# Patient Record
Sex: MALE | Race: BLACK OR AFRICAN AMERICAN | NOT HISPANIC OR LATINO | URBAN - METROPOLITAN AREA
[De-identification: names, ages, dates, MRNs, and addresses within clinical notes are randomized per-mention and may not be internally consistent; named-entity substitution may affect disease eponyms.]

---

## 2017-05-23 ENCOUNTER — EMERGENCY (EMERGENCY)
Facility: HOSPITAL | Age: 47
LOS: 1 days | End: 2017-05-23
Payer: OTHER GOVERNMENT

## 2017-05-23 PROBLEM — Z00.00 ENCOUNTER FOR PREVENTIVE HEALTH EXAMINATION: Status: ACTIVE | Noted: 2017-05-23

## 2017-05-23 PROCEDURE — 99285 EMERGENCY DEPT VISIT HI MDM: CPT

## 2017-05-24 PROCEDURE — 74177 CT ABD & PELVIS W/CONTRAST: CPT | Mod: 26

## 2020-06-19 ENCOUNTER — RESULT REVIEW (OUTPATIENT)
Age: 50
End: 2020-06-19

## 2020-06-20 ENCOUNTER — RESULT REVIEW (OUTPATIENT)
Age: 50
End: 2020-06-20

## 2020-07-08 ENCOUNTER — TRANSCRIPTION ENCOUNTER (OUTPATIENT)
Age: 50
End: 2020-07-08

## 2021-11-18 ENCOUNTER — EMERGENCY (EMERGENCY)
Facility: HOSPITAL | Age: 51
LOS: 1 days | Discharge: HOME | End: 2021-11-18
Payer: COMMERCIAL

## 2021-11-18 VITALS
SYSTOLIC BLOOD PRESSURE: 124 MMHG | OXYGEN SATURATION: 97 % | HEART RATE: 90 BPM | TEMPERATURE: 98 F | RESPIRATION RATE: 18 BRPM | DIASTOLIC BLOOD PRESSURE: 77 MMHG

## 2021-11-18 LAB — SARS-COV-2 RNA SPEC QL NAA+PROBE: SIGNIFICANT CHANGE UP

## 2021-11-18 PROCEDURE — 99283 EMERGENCY DEPT VISIT LOW MDM: CPT

## 2021-11-18 PROCEDURE — U0003: CPT

## 2021-11-18 PROCEDURE — U0005: CPT

## 2021-11-18 PROCEDURE — 99284 EMERGENCY DEPT VISIT MOD MDM: CPT

## 2021-11-18 NOTE — ED PROVIDER NOTE - OBJECTIVE STATEMENT
51 .y/o M presents to ED for COVID testing. Pt currently presents with nausea and bodyaches. No chest pain, sob, cough, abd pain, v/d, headache, dizziness. - sick contacts. No recent travel. Pt well appearing. NAD.

## 2021-11-18 NOTE — ED PROVIDER NOTE - CLINICAL SUMMARY MEDICAL DECISION MAKING FREE TEXT BOX
Pt nontoxic appearing, stable vitals, ambulatory with stable saturation without supplemental oxygen. PT does not meet criteria listed in most updated guidelines as per [facility] protocol/algorithm for admission at this time. pt advised about self-quarantine instructions until negative test results and/or symptom resolution. pt advised on hand hygiene, monitoring of symptoms, antipyretic use as well as and fu with primary care provider. Instructions given in pre-printed copy.

## 2021-11-18 NOTE — ED PROVIDER NOTE - PATIENT PORTAL LINK FT
You can access the FollowMyHealth Patient Portal offered by [facility] by registering at the following website: [email or web address]. By joining [employer]’s FollowMyHealth portal, you will also be able to view your health information using other applications (apps) compatible with our system.

## 2021-11-20 DIAGNOSIS — M79.10 MYALGIA, UNSPECIFIED SITE: ICD-10-CM

## 2021-11-20 DIAGNOSIS — R11.0 NAUSEA: ICD-10-CM

## 2021-11-20 DIAGNOSIS — Z20.822 CONTACT WITH AND (SUSPECTED) EXPOSURE TO COVID-19: ICD-10-CM

## 2022-01-03 ENCOUNTER — EMERGENCY (EMERGENCY)
Facility: HOSPITAL | Age: 52
LOS: 1 days | Discharge: HOME | End: 2022-01-03
Attending: EMERGENCY MEDICINE
Payer: COMMERCIAL

## 2022-01-03 VITALS
DIASTOLIC BLOOD PRESSURE: 73 MMHG | TEMPERATURE: 98 F | RESPIRATION RATE: 18 BRPM | SYSTOLIC BLOOD PRESSURE: 119 MMHG | HEART RATE: 71 BPM | OXYGEN SATURATION: 99 % | WEIGHT: 190.04 LBS

## 2022-01-03 PROCEDURE — 99284 EMERGENCY DEPT VISIT MOD MDM: CPT | Mod: 25

## 2022-01-03 PROCEDURE — 99284 EMERGENCY DEPT VISIT MOD MDM: CPT

## 2022-01-03 PROCEDURE — 0225U NFCT DS DNA&RNA 21 SARSCOV2: CPT

## 2022-01-03 RX ORDER — LIDOCAINE 4 G/100G
1 CREAM TOPICAL
Qty: 4 | Refills: 0
Start: 2022-01-03 | End: 2022-01-06

## 2022-01-03 RX ORDER — IBUPROFEN 200 MG
1 TABLET ORAL
Qty: 20 | Refills: 0
Start: 2022-01-03 | End: 2022-01-07

## 2022-01-03 RX ORDER — IBUPROFEN 200 MG
1 TABLET ORAL
Qty: 20 | Refills: 0
Start: 2022-01-03 | End: 2022-08-27

## 2022-01-03 RX ORDER — KETOROLAC TROMETHAMINE 30 MG/ML
30 SYRINGE (ML) INJECTION ONCE
Refills: 0 | Status: DISCONTINUED | OUTPATIENT
Start: 2022-01-03 | End: 2022-01-03

## 2022-01-03 RX ORDER — ACETAMINOPHEN 500 MG
650 TABLET ORAL ONCE
Refills: 0 | Status: COMPLETED | OUTPATIENT
Start: 2022-01-03 | End: 2022-01-03

## 2022-01-03 RX ORDER — DIAZEPAM 5 MG
5 TABLET ORAL ONCE
Refills: 0 | Status: DISCONTINUED | OUTPATIENT
Start: 2022-01-03 | End: 2022-01-03

## 2022-01-03 RX ORDER — IBUPROFEN 200 MG
600 TABLET ORAL ONCE
Refills: 0 | Status: COMPLETED | OUTPATIENT
Start: 2022-01-03 | End: 2022-01-03

## 2022-01-03 RX ORDER — METHOCARBAMOL 500 MG/1
1500 TABLET, FILM COATED ORAL ONCE
Refills: 0 | Status: COMPLETED | OUTPATIENT
Start: 2022-01-03 | End: 2022-01-03

## 2022-01-03 RX ORDER — METHOCARBAMOL 500 MG/1
1 TABLET, FILM COATED ORAL
Qty: 16 | Refills: 0
Start: 2022-01-03 | End: 2022-01-06

## 2022-01-03 RX ORDER — LIDOCAINE 4 G/100G
1 CREAM TOPICAL
Qty: 4 | Refills: 0
Start: 2022-01-03 | End: 2022-08-26

## 2022-01-03 RX ORDER — LIDOCAINE 4 G/100G
1 CREAM TOPICAL ONCE
Refills: 0 | Status: COMPLETED | OUTPATIENT
Start: 2022-01-03 | End: 2022-01-03

## 2022-01-03 RX ADMIN — METHOCARBAMOL 1500 MILLIGRAM(S): 500 TABLET, FILM COATED ORAL at 22:16

## 2022-01-03 RX ADMIN — Medication 600 MILLIGRAM(S): at 22:16

## 2022-01-03 RX ADMIN — Medication 650 MILLIGRAM(S): at 22:16

## 2022-01-03 RX ADMIN — LIDOCAINE 1 PATCH: 4 CREAM TOPICAL at 22:16

## 2022-01-03 NOTE — ED PROVIDER NOTE - NSTIMEPROVIDERCAREINITIATE_GEN_ER
03-Jan-2022 21:22
Enrrique Maza (DO), Medicine  Dept Director  [address] [address]  Phone: [phone]  Fax: [phone]

## 2022-01-03 NOTE — ED PROVIDER NOTE - PATIENT PORTAL LINK FT
You can access the FollowMyHealth Patient Portal offered by [facility] by registering at the following website: [email or web address]. By joining [employer]’s FollowMyHealth portal, you will also be able to view your health information using other applications (apps) compatible with our system.

## 2022-01-03 NOTE — ED PROVIDER NOTE - CLINICAL SUMMARY MEDICAL DECISION MAKING FREE TEXT BOX
PT with stable VS, no acute distress, non toxic appearing, tolerating PO in the ED, Pt with no fever, able to move neck with encouragement, Pt with no photophobia, no HA, no dizziness, Pt neuro intact, Pt to be tx with trial of symptomatic medications, follow up to PCP, return for change in symptoms, educated about when to return to the ED if needed. PT verbalizes that he understands all instructions and results. Pt informed that ED is open and available 24/7 365 days a yr, encouraged to return to the ED if they have any change in condition, or feel the need for revaluation.

## 2022-01-03 NOTE — ED PROVIDER NOTE - ADDITIONAL NOTES AND INSTRUCTIONS:
PT was evaluated At [facility] ED and was found to have a condition that warranted time of to rest and heal from WORK/SCHOOL.   Rd Feliciano PA-C

## 2022-01-03 NOTE — ED PROVIDER NOTE - ATTENDING CONTRIBUTION TO CARE
I personally saw the patient with the PA, and completed the key components of the history and physical exam. I then discussed the management plan with the PA.   gen in nad resp clear cardiac no murmur abfd soft neuro intact

## 2022-01-03 NOTE — ED PROVIDER NOTE - NS CPE EDP MUSC CERVICAL LOC
BL paraspinal ttp, spasm of muscles. no midline ttp, MARGAUX, strenght intact, no palpible katie abnormality.

## 2022-01-03 NOTE — ED PROVIDER NOTE - NS ED ROS FT
ROS: CONTUSIONAL: Denies fever, chills, fatigue, wt loss. HEAD: Denies trauma, HA, Dizziness. EYE: Denies Acute visual changes, diplopia. ENMT:  nasal congewtion Denies change in hearing, tinnitus, epistaxis, difficulty swallowing, sore throat. CARDIO: Denies CP, palpitations, edema. RESP: Denies Cough, SOB , Diff breathing, hemoptysis. GI: Denies N/V, ABD pain, change in bowel movement. URINARY: Denies difficulty urinating, pelvic pain. MS:  Denies joint pain, back pain, weakness, decreased ROM, swelling. NEURO: Denies change in gait, seizures, loss of sensation, dizziness, confusion LOC.  PSY: NO SI/HI. SKIN: Denies Rash, bruising.

## 2022-01-03 NOTE — ED PROVIDER NOTE - OBJECTIVE STATEMENT
PT with no SPMHx presents to the ED with complaint of neck pain x 2 days. Pt states that he has had general viral illness over the last wk with nasal congestion, body aches, chills, then yesterday started to have sever neck pain. Pt states that he now has sever, constat, sharp neck pain that radiates down his back made worse with activity improved by nothing. PT denies fever, chills, numbness tingling, loss of sensation saddle anesthesia, weakness, HE, loss of control of urine, or bowels IVDA.

## 2022-01-04 PROBLEM — Z78.9 OTHER SPECIFIED HEALTH STATUS: Chronic | Status: ACTIVE | Noted: 2021-11-18

## 2022-01-04 LAB
RAPID RVP RESULT: SIGNIFICANT CHANGE UP
SARS-COV-2 RNA SPEC QL NAA+PROBE: SIGNIFICANT CHANGE UP

## 2022-01-27 ENCOUNTER — EMERGENCY (EMERGENCY)
Facility: HOSPITAL | Age: 52
LOS: 1 days | Discharge: HOME | End: 2022-01-27
Attending: EMERGENCY MEDICINE
Payer: COMMERCIAL

## 2022-01-27 VITALS
SYSTOLIC BLOOD PRESSURE: 119 MMHG | TEMPERATURE: 98 F | WEIGHT: 160.06 LBS | RESPIRATION RATE: 20 BRPM | DIASTOLIC BLOOD PRESSURE: 74 MMHG | HEART RATE: 80 BPM | OXYGEN SATURATION: 99 %

## 2022-01-27 LAB
ALBUMIN SERPL ELPH-MCNC: 3.8 G/DL — SIGNIFICANT CHANGE UP (ref 3.3–5.2)
ALP SERPL-CCNC: 63 U/L — SIGNIFICANT CHANGE UP (ref 40–120)
ALT FLD-CCNC: 14 U/L — SIGNIFICANT CHANGE UP
ANION GAP SERPL CALC-SCNC: 11 MMOL/L — SIGNIFICANT CHANGE UP (ref 5–17)
APTT BLD: 29.9 SEC — SIGNIFICANT CHANGE UP (ref 27.5–35.5)
AST SERPL-CCNC: 16 U/L — SIGNIFICANT CHANGE UP
BASOPHILS # BLD AUTO: 0.05 K/UL — SIGNIFICANT CHANGE UP (ref 0–0.2)
BASOPHILS NFR BLD AUTO: 0.3 % — SIGNIFICANT CHANGE UP (ref 0–2)
BILIRUB SERPL-MCNC: 0.6 MG/DL — SIGNIFICANT CHANGE UP (ref 0.4–2)
BUN SERPL-MCNC: 12.7 MG/DL — SIGNIFICANT CHANGE UP (ref 8–20)
CALCIUM SERPL-MCNC: 8.9 MG/DL — SIGNIFICANT CHANGE UP (ref 8.6–10.2)
CHLORIDE SERPL-SCNC: 102 MMOL/L — SIGNIFICANT CHANGE UP (ref 98–107)
CK MB CFR SERPL CALC: 2.7 NG/ML — SIGNIFICANT CHANGE UP (ref 0–6.7)
CK SERPL-CCNC: 157 U/L — SIGNIFICANT CHANGE UP (ref 30–200)
CO2 SERPL-SCNC: 24 MMOL/L — SIGNIFICANT CHANGE UP (ref 22–29)
CREAT SERPL-MCNC: 1.05 MG/DL — SIGNIFICANT CHANGE UP (ref 0.5–1.3)
EOSINOPHIL # BLD AUTO: 0.35 K/UL — SIGNIFICANT CHANGE UP (ref 0–0.5)
EOSINOPHIL NFR BLD AUTO: 2.4 % — SIGNIFICANT CHANGE UP (ref 0–6)
GLUCOSE SERPL-MCNC: 97 MG/DL — SIGNIFICANT CHANGE UP (ref 70–99)
HCOV PNL SPEC NAA+PROBE: DETECTED
HCT VFR BLD CALC: 41 % — SIGNIFICANT CHANGE UP (ref 39–50)
HGB BLD-MCNC: 13.1 G/DL — SIGNIFICANT CHANGE UP (ref 13–17)
IMM GRANULOCYTES NFR BLD AUTO: 0.3 % — SIGNIFICANT CHANGE UP (ref 0–1.5)
INR BLD: 1.07 RATIO — SIGNIFICANT CHANGE UP (ref 0.88–1.16)
LYMPHOCYTES # BLD AUTO: 2.95 K/UL — SIGNIFICANT CHANGE UP (ref 1–3.3)
LYMPHOCYTES # BLD AUTO: 20.2 % — SIGNIFICANT CHANGE UP (ref 13–44)
MCHC RBC-ENTMCNC: 27.1 PG — SIGNIFICANT CHANGE UP (ref 27–34)
MCHC RBC-ENTMCNC: 32 GM/DL — SIGNIFICANT CHANGE UP (ref 32–36)
MCV RBC AUTO: 84.7 FL — SIGNIFICANT CHANGE UP (ref 80–100)
MONOCYTES # BLD AUTO: 1.05 K/UL — HIGH (ref 0–0.9)
MONOCYTES NFR BLD AUTO: 7.2 % — SIGNIFICANT CHANGE UP (ref 2–14)
NEUTROPHILS # BLD AUTO: 10.2 K/UL — HIGH (ref 1.8–7.4)
NEUTROPHILS NFR BLD AUTO: 69.6 % — SIGNIFICANT CHANGE UP (ref 43–77)
PLATELET # BLD AUTO: 277 K/UL — SIGNIFICANT CHANGE UP (ref 150–400)
POTASSIUM SERPL-MCNC: 4.5 MMOL/L — SIGNIFICANT CHANGE UP (ref 3.5–5.3)
POTASSIUM SERPL-SCNC: 4.5 MMOL/L — SIGNIFICANT CHANGE UP (ref 3.5–5.3)
PROT SERPL-MCNC: 7 G/DL — SIGNIFICANT CHANGE UP (ref 6.6–8.7)
PROTHROM AB SERPL-ACNC: 12.4 SEC — SIGNIFICANT CHANGE UP (ref 10.6–13.6)
RAPID RVP RESULT: DETECTED
RBC # BLD: 4.84 M/UL — SIGNIFICANT CHANGE UP (ref 4.2–5.8)
RBC # FLD: 14.2 % — SIGNIFICANT CHANGE UP (ref 10.3–14.5)
SARS-COV-2 RNA SPEC QL NAA+PROBE: SIGNIFICANT CHANGE UP
SODIUM SERPL-SCNC: 137 MMOL/L — SIGNIFICANT CHANGE UP (ref 135–145)
WBC # BLD: 14.64 K/UL — HIGH (ref 3.8–10.5)
WBC # FLD AUTO: 14.64 K/UL — HIGH (ref 3.8–10.5)

## 2022-01-27 PROCEDURE — 80053 COMPREHEN METABOLIC PANEL: CPT

## 2022-01-27 PROCEDURE — 85025 COMPLETE CBC W/AUTO DIFF WBC: CPT

## 2022-01-27 PROCEDURE — 94640 AIRWAY INHALATION TREATMENT: CPT

## 2022-01-27 PROCEDURE — 99284 EMERGENCY DEPT VISIT MOD MDM: CPT

## 2022-01-27 PROCEDURE — 71045 X-RAY EXAM CHEST 1 VIEW: CPT | Mod: 26

## 2022-01-27 PROCEDURE — 82550 ASSAY OF CK (CPK): CPT

## 2022-01-27 PROCEDURE — 82553 CREATINE MB FRACTION: CPT

## 2022-01-27 PROCEDURE — 99284 EMERGENCY DEPT VISIT MOD MDM: CPT | Mod: 25

## 2022-01-27 PROCEDURE — 71045 X-RAY EXAM CHEST 1 VIEW: CPT

## 2022-01-27 PROCEDURE — 96374 THER/PROPH/DIAG INJ IV PUSH: CPT

## 2022-01-27 PROCEDURE — 85730 THROMBOPLASTIN TIME PARTIAL: CPT

## 2022-01-27 PROCEDURE — 0225U NFCT DS DNA&RNA 21 SARSCOV2: CPT

## 2022-01-27 PROCEDURE — 36415 COLL VENOUS BLD VENIPUNCTURE: CPT

## 2022-01-27 PROCEDURE — 85610 PROTHROMBIN TIME: CPT

## 2022-01-27 PROCEDURE — 96375 TX/PRO/DX INJ NEW DRUG ADDON: CPT

## 2022-01-27 RX ORDER — METOCLOPRAMIDE HCL 10 MG
10 TABLET ORAL ONCE
Refills: 0 | Status: COMPLETED | OUTPATIENT
Start: 2022-01-27 | End: 2022-01-27

## 2022-01-27 RX ORDER — OXYCODONE AND ACETAMINOPHEN 5; 325 MG/1; MG/1
1 TABLET ORAL ONCE
Refills: 0 | Status: DISCONTINUED | OUTPATIENT
Start: 2022-01-27 | End: 2022-01-27

## 2022-01-27 RX ORDER — KETOROLAC TROMETHAMINE 30 MG/ML
15 SYRINGE (ML) INJECTION ONCE
Refills: 0 | Status: DISCONTINUED | OUTPATIENT
Start: 2022-01-27 | End: 2022-01-27

## 2022-01-27 RX ORDER — CYCLOBENZAPRINE HYDROCHLORIDE 10 MG/1
10 TABLET, FILM COATED ORAL ONCE
Refills: 0 | Status: COMPLETED | OUTPATIENT
Start: 2022-01-27 | End: 2022-01-27

## 2022-01-27 RX ORDER — ALBUTEROL 90 UG/1
2 AEROSOL, METERED ORAL ONCE
Refills: 0 | Status: COMPLETED | OUTPATIENT
Start: 2022-01-27 | End: 2022-01-27

## 2022-01-27 RX ORDER — ACETAMINOPHEN 500 MG
1 TABLET ORAL
Qty: 20 | Refills: 0
Start: 2022-01-27

## 2022-01-27 RX ORDER — SODIUM CHLORIDE 9 MG/ML
1000 INJECTION INTRAMUSCULAR; INTRAVENOUS; SUBCUTANEOUS ONCE
Refills: 0 | Status: COMPLETED | OUTPATIENT
Start: 2022-01-27 | End: 2022-01-27

## 2022-01-27 RX ADMIN — Medication 15 MILLIGRAM(S): at 17:46

## 2022-01-27 RX ADMIN — SODIUM CHLORIDE 1000 MILLILITER(S): 9 INJECTION INTRAMUSCULAR; INTRAVENOUS; SUBCUTANEOUS at 16:15

## 2022-01-27 RX ADMIN — ALBUTEROL 2 PUFF(S): 90 AEROSOL, METERED ORAL at 17:47

## 2022-01-27 RX ADMIN — Medication 10 MILLIGRAM(S): at 17:46

## 2022-01-27 RX ADMIN — CYCLOBENZAPRINE HYDROCHLORIDE 10 MILLIGRAM(S): 10 TABLET, FILM COATED ORAL at 16:21

## 2022-01-27 RX ADMIN — OXYCODONE AND ACETAMINOPHEN 1 TABLET(S): 5; 325 TABLET ORAL at 16:21

## 2022-01-27 NOTE — ED PROVIDER NOTE - PATIENT PORTAL LINK FT
You can access the FollowMyHealth Patient Portal offered by [facility] by registering at the following website: [email or web address]. By joining [employer]’s FollowMyHealth portal, you will also be able to view your health information using other applications (apps) compatible with our system.

## 2022-01-27 NOTE — ED PROVIDER NOTE - PHYSICAL EXAMINATION
Gen: No acute distress, non toxic  HEENT: Mucous membranes moist, pink conjunctivae, EOMI  CV: RRR, nl s1/s2.  Resp: CTAB, normal rate and effort  GI: Abdomen soft, NT, ND. No rebound, no guarding  : No CVAT  Neuro: A&O x 3, moving all 4 extremities  MSK: No spine or joint tenderness to palpation. some generalized ttp to b/l LE. pain to legs with standing. strength/cooperation somewhat limited by pain to b/l LE nl sensation and pulse distally.   Skin: No rashes. intact and perfused. Gen: No acute distress, non toxic  HEENT: Mucous membranes moist, pink conjunctivae, EOMI  CV: RRR, nl s1/s2.  Resp: CTAB, normal rate and effort  GI: Abdomen soft, NT, ND. No rebound, no guarding  : No CVAT  Neuro: A&O x 3, moving all 4 extremities. nl reflexes LE.  MSK: No spine or joint tenderness to palpation. some generalized ttp to b/l LE. pain to legs with standing. strength/cooperation somewhat limited by pain to b/l LE nl sensation and pulse distally.   Skin: No rashes. intact and perfused. Gen: No acute distress, non toxic  HEENT: Mucous membranes moist, pink conjunctivae, EOMI  CV: RRR, nl s1/s2.  Resp: CTAB, normal rate and effort  GI: Abdomen soft, NT, ND. No rebound, no guarding  : No CVAT  Neuro: A&O x 3, moving all 4 extremities. nl reflexes LE.  MSK: No spine or joint tenderness to palpation. some generalized ttp to b/l LE. pain to legs with standing. strength/cooperation somewhat limited by pain to b/l LE though has strength and can stand/step. nl sensation and pulse distally.   Skin: No rashes. intact and perfused.

## 2022-01-27 NOTE — ED PROVIDER NOTE - NSFOLLOWUPINSTRUCTIONS_ED_ALL_ED_FT
Cough    Coughing is a reflex that clears your throat and your airways. Coughing helps to heal and protect your lungs. It is normal to cough occasionally, but a cough that happens with other symptoms or lasts a long time may be a sign of a condition that needs treatment. Coughing may be caused by infections, asthma or COPD, smoking, postnasal drip, gastroesophageal reflux, as well as other medical conditions. Take medicines only as instructed by your health care provider. Avoid environments or triggers that causes you to cough at work or at home.    SEEK IMMEDIATE MEDICAL CARE IF YOU HAVE ANY OF THE FOLLOWING SYMPTOMS: coughing up blood, shortness of breath, rapid heart rate, chest pain, unexplained weight loss or night sweats.    Headache    A headache is pain or discomfort felt around the head or neck area. The specific cause of a headache may not be found as there are many types including tension headaches, migraine headaches, and cluster headaches. Watch your condition for any changes. Things you can do to manage your pain include taking over the counter and prescription medications as instructed by your health care provider, lying down in a dark quiet room, limiting stress, getting regular sleep, and refraining from alcohol and tobacco products.    SEEK IMMEDIATE MEDICAL CARE IF YOU HAVE ANY OF THE FOLLOWING SYMPTOMS: fever, vomiting, stiff neck, loss of vision, problems with speech, muscle weakness, loss of balance, trouble walking, passing out, or confusion.

## 2022-01-27 NOTE — ED ADULT TRIAGE NOTE - CHIEF COMPLAINT QUOTE
presents to ed c/o coughing up blood x 3 days. had covid test and it was negative, but he said positive for a virus. denies night sweats denies recent travel

## 2022-01-27 NOTE — ED ADULT NURSE NOTE - NSIMPLEMENTINTERV_GEN_ALL_ED
Implemented All Universal Safety Interventions:  [place] to call system. Call bell, personal items and telephone within reach. Instruct patient to call for assistance. Room bathroom lighting operational. Non-slip footwear when patient is off stretcher. Physically safe environment: no spills, clutter or unnecessary equipment. Stretcher in lowest position, wheels locked, appropriate side rails in place.

## 2022-01-27 NOTE — ED PROVIDER NOTE - CLINICAL SUMMARY MEDICAL DECISION MAKING FREE TEXT BOX
cough x3 days, occsaional blood streak, with b/l LE pain limiting ambulation. some mild weakness to LE which pt reports can't use more strength due to pain. labs, rvp, reassess.

## 2022-01-27 NOTE — ED PROVIDER NOTE - OBJECTIVE STATEMENT
52 y/o male c/o 3 days of cough with generalized achiness/weakness and pain to b/l legs. States had neg covid last week. 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 No chest pain, no abd pain, no numbness or focal weakness. Reports difficulty walking/getting around on legs due to pain to b/l legs. STates small blood streaking mucus after coughign a lot, no other hemoptysis. no night sweats or exposure to TB.     ROS: No fever/chills. No eye pain/changes in vision, No ear pain/sore throat/dysphagia, No chest pain/palpitations. No SOB No abdominal pain, N/V/D, no black/bloody bm. No dysuria/frequency/discharge, No headache. No Dizziness.    No rashes or breaks in skin. No numbness/tingling/weakness.

## 2022-01-27 NOTE — ED ADULT NURSE NOTE - NSFALLRSKASSESASSIST_ED_ALL_ED
no

## 2022-01-27 NOTE — ED ADULT NURSE NOTE - OBJECTIVE STATEMENT
Patient presented to ED with coughing up blood x3days. Patient states  he woke up very malaise and achy today. Labs, medication completed. No distress noted, no further complaints at this time.

## 2022-01-27 NOTE — ED PROVIDER NOTE - PROGRESS NOTE DETAILS
Edu: pt ambulating, no weakness. states leg pain significnatly improved had some headache which improves. no meningeal sympotms. likely viral symptoms. return precautions. otherwise well with nl neuro. stable for d/c.

## 2022-02-14 NOTE — ED PROVIDER NOTE - IV ALTEPLASE EXCL ABS HIDDEN
[facility] - Oncology (Hospital)  Discharge Reassessment    Primary Care Provider: [facility]    Expected Discharge Date: 2/17/2022     Per MD, patient is now a DNR.  Palliative Care following for possible hospice.  CM will follow for needs.     Reassessment (most recent)     Discharge Reassessment - 02/14/22 1255        Discharge Reassessment    Assessment Type Discharge Planning Reassessment     Did the patient's condition or plan change since previous assessment? No     Communicated HEAVENLY with patient/caregiver Yes;Date not available/Unable to determine     Discharge Plan A Hospice/home     Discharge Plan B Skilled Nursing Facility     DME Needed Upon Discharge  none     Discharge Barriers Identified None     Why the patient remains in the hospital Requires continued medical care               Shyla Sarabia RN, CCRN-K, [facility]  Neuro-Critical Care   [phone]     show

## 2022-08-23 ENCOUNTER — EMERGENCY (EMERGENCY)
Facility: HOSPITAL | Age: 52
LOS: 1 days | Discharge: HOME | End: 2022-08-23
Attending: EMERGENCY MEDICINE
Payer: COMMERCIAL

## 2022-08-23 VITALS
TEMPERATURE: 98 F | HEART RATE: 77 BPM | RESPIRATION RATE: 16 BRPM | DIASTOLIC BLOOD PRESSURE: 82 MMHG | WEIGHT: 179.9 LBS | SYSTOLIC BLOOD PRESSURE: 113 MMHG | OXYGEN SATURATION: 95 %

## 2022-08-23 PROCEDURE — 99284 EMERGENCY DEPT VISIT MOD MDM: CPT

## 2022-08-23 PROCEDURE — 99283 EMERGENCY DEPT VISIT LOW MDM: CPT

## 2022-08-23 RX ORDER — ACETAMINOPHEN 500 MG
650 TABLET ORAL ONCE
Refills: 0 | Status: COMPLETED | OUTPATIENT
Start: 2022-08-23 | End: 2022-08-23

## 2022-08-23 RX ORDER — METHOCARBAMOL 500 MG/1
1500 TABLET, FILM COATED ORAL ONCE
Refills: 0 | Status: COMPLETED | OUTPATIENT
Start: 2022-08-23 | End: 2022-08-23

## 2022-08-23 RX ORDER — LIDOCAINE 4 G/100G
1 CREAM TOPICAL ONCE
Refills: 0 | Status: COMPLETED | OUTPATIENT
Start: 2022-08-23 | End: 2022-08-23

## 2022-08-23 RX ORDER — IBUPROFEN 200 MG
600 TABLET ORAL ONCE
Refills: 0 | Status: COMPLETED | OUTPATIENT
Start: 2022-08-23 | End: 2022-08-23

## 2022-08-23 RX ORDER — METHOCARBAMOL 500 MG/1
1 TABLET, FILM COATED ORAL
Qty: 15 | Refills: 0
Start: 2022-08-23 | End: 2022-08-27

## 2022-08-23 RX ADMIN — METHOCARBAMOL 1500 MILLIGRAM(S): 500 TABLET, FILM COATED ORAL at 22:06

## 2022-08-23 RX ADMIN — Medication 650 MILLIGRAM(S): at 22:06

## 2022-08-23 RX ADMIN — LIDOCAINE 1 PATCH: 4 CREAM TOPICAL at 22:06

## 2022-08-23 RX ADMIN — Medication 600 MILLIGRAM(S): at 22:06

## 2022-08-23 NOTE — ED PROVIDER NOTE - NSFOLLOWUPCLINICS_GEN_ALL_ED_FT
[facility]  Neurosurgery/Spine  [address], [address]  [place] [address]  Phone: [phone]  Fax:

## 2022-08-23 NOTE — ED PROVIDER NOTE - PATIENT PORTAL LINK FT
You can access the FollowMyHealth Patient Portal offered by [facility] by registering at the following website: [email or web address]. By joining [facility]’s FollowMyHealth portal, you will also be able to view your health information using other applications (apps) compatible with our system.

## 2022-08-23 NOTE — ED PROVIDER NOTE - CLINICAL SUMMARY MEDICAL DECISION MAKING FREE TEXT BOX
acute on chronic back pain, no focal neuro deficits. pain after heavy lifting. no red flags. pain control and fu with [facility] and [facility]

## 2022-08-23 NOTE — ED PROVIDER NOTE - ATTENDING APP SHARED VISIT CONTRIBUTION OF CARE
51yoM; with PMH signif for chronic back pain; now p/w back pain--right lower back pain, s/p bending  and doing heavy lifting, non-radiating. denies bowel/bladder incontinence. denies numbness/tingling. denies focal weakness. denies n/v. denies dysuria, frequency, urgency.  General:     NAD  Head:     NC/AT, EOMI  Neck:     trachea midline  Lungs:     CTA b/l, no w/r/r  CVS:     S1S2, RRR, no m/g/r  BACK: nt midline c/t/l/s spine.  right lower paraspinal muscular tenderness.   Ext:    2+ radial and pedal pulses, no c/c/e  Neuro: AAOx3, no sensory/motor deficits. strength/sensation/reflexes intact b/l LE  A/P:  51yoM p/w back pain s/p heavy lifting.   -pain control, f/up with spine

## 2022-08-23 NOTE — ED PROVIDER NOTE - NS ED ATTENDING STATEMENT MOD
This was a shared visit with the MEGA. I reviewed and verified the documentation and independently performed the documented:

## 2022-08-23 NOTE — ED PROVIDER NOTE - PHYSICAL EXAMINATION
Gen: Well appearing in NAD  Head: NC/AT  Neck: trachea midline  Resp:  No distress  SPINE no midline pt vertebral or step offs + right lumbarsacral paraspinal muscle tedneress no rash no ecchymosis - CVAT. able to staight leg raise bilaterally. sesnation intact FROM hip knee and ankle.   Ext: no deformities  Neuro:  A&O appears non focal  Skin:  Warm and dry as visualized  Psych:  Normal affect and mood

## 2022-08-23 NOTE — ED ADULT TRIAGE NOTE - CHIEF COMPLAINT QUOTE
Patient BIBEMS c/o 10/10 chronic back pain since this morning. Patient states he worked a long day yesterday which is why he's in pain. Patient did not medicate for pain. Ambulatory on scene.

## 2022-08-23 NOTE — ED ADULT NURSE REASSESSMENT NOTE - NS ED NURSE REASSESS COMMENT FT1
patient seen and evaluated by MD. pt medicated as per order.  No sings of acute distress noted, respirations even and unlabored. Refer to provider notes. pt dc, pt verbalizes dc instructions.

## 2022-08-23 NOTE — ED PROVIDER NOTE - OBJECTIVE STATEMENT
50 yo male hx of chronic back pain reports [facility] reports has not followed with a specialist. reports 2 days of right lower back pain non radiating no associated numbness or tinlging, no fever or chills. reports pain started after lifting a box while at work. no bladder or bowel incontinence no saddle anesthesia no medication taken for pain control no ivdu.

## 2023-01-17 NOTE — ED ADULT TRIAGE NOTE - CHIEF COMPLAINT QUOTE
C/o neck pain and nasal congestion x2 days. Denies cp, sob, nausea, vomiting. 67 yo M w HTN, HLD, CAD/MI s/p CABG/VERONICA, severe HF/ICM s/p AICD, Uncontrolled DM w peripheral neuropathy, R AKA, MRSA bacteremia, Psoriasis (on topical steroids), L ankle surgery with fixation hardware presents for recurrent cellulitis. Patient started on Abx and admitted for further evaluation.

## 2023-07-16 ENCOUNTER — EMERGENCY (EMERGENCY)
Facility: HOSPITAL | Age: 53
LOS: 1 days | Discharge: HOME | End: 2023-07-16
Attending: EMERGENCY MEDICINE
Payer: COMMERCIAL

## 2023-07-16 VITALS
TEMPERATURE: 97 F | DIASTOLIC BLOOD PRESSURE: 73 MMHG | RESPIRATION RATE: 18 BRPM | HEART RATE: 63 BPM | OXYGEN SATURATION: 99 % | SYSTOLIC BLOOD PRESSURE: 115 MMHG

## 2023-07-16 VITALS
SYSTOLIC BLOOD PRESSURE: 106 MMHG | WEIGHT: 154.98 LBS | HEIGHT: 72 IN | HEART RATE: 74 BPM | RESPIRATION RATE: 18 BRPM | TEMPERATURE: 98 F | DIASTOLIC BLOOD PRESSURE: 70 MMHG | OXYGEN SATURATION: 99 %

## 2023-07-16 PROCEDURE — 99283 EMERGENCY DEPT VISIT LOW MDM: CPT

## 2023-07-16 PROCEDURE — 99284 EMERGENCY DEPT VISIT MOD MDM: CPT

## 2023-07-16 RX ORDER — CYCLOBENZAPRINE HYDROCHLORIDE 10 MG/1
1 TABLET, FILM COATED ORAL
Qty: 21 | Refills: 0
Start: 2023-07-16 | End: 2023-07-22

## 2023-07-16 RX ORDER — LIDOCAINE 4 G/100G
1 CREAM TOPICAL ONCE
Refills: 0 | Status: COMPLETED | OUTPATIENT
Start: 2023-07-16 | End: 2023-07-16

## 2023-07-16 RX ORDER — CYCLOBENZAPRINE HYDROCHLORIDE 10 MG/1
10 TABLET, FILM COATED ORAL ONCE
Refills: 0 | Status: COMPLETED | OUTPATIENT
Start: 2023-07-16 | End: 2023-07-16

## 2023-07-16 RX ORDER — IBUPROFEN 200 MG
1 TABLET ORAL
Qty: 21 | Refills: 0
Start: 2023-07-16 | End: 2023-07-22

## 2023-07-16 RX ORDER — IBUPROFEN 200 MG
600 TABLET ORAL ONCE
Refills: 0 | Status: COMPLETED | OUTPATIENT
Start: 2023-07-16 | End: 2023-07-16

## 2023-07-16 RX ORDER — ACETAMINOPHEN 500 MG
975 TABLET ORAL ONCE
Refills: 0 | Status: COMPLETED | OUTPATIENT
Start: 2023-07-16 | End: 2023-07-16

## 2023-07-16 RX ADMIN — Medication 600 MILLIGRAM(S): at 05:13

## 2023-07-16 RX ADMIN — LIDOCAINE 1 PATCH: 4 CREAM TOPICAL at 05:13

## 2023-07-16 RX ADMIN — Medication 975 MILLIGRAM(S): at 05:13

## 2023-07-16 NOTE — ED PROVIDER NOTE - CLINICAL SUMMARY MEDICAL DECISION MAKING FREE TEXT BOX
51yo M p/w back pain s/p lifting heavy bag. on eval, difficulty ambulating, no spinal ttp, FROM of ext, neuro intact. ordered pain meds- will reassess 53yo M p/w back pain s/p lifting heavy bag. on eval, difficulty ambulating, no spinal ttp, FROM of ext, neuro intact. ordered pain meds- reported improvement after meds- sent meds to pharmacy. advised to f/u with PCP. discussed supportive care measures and return precautions. pt verbalized understanding and agreement

## 2023-07-16 NOTE — ED PROVIDER NOTE - OBJECTIVE STATEMENT
51yo M presents to ED c/o back pain x3h s/p picking up heavy garbage bag while at work. pt reported sudden immediate sharp pain while straightening back after picking up garbage bag. pt reports difficulty ambulating due to pain. pain worse in certain positions. denies numbness, weakness, urinary/bowel incont, fall

## 2023-07-16 NOTE — ED PROVIDER NOTE - PHYSICAL EXAMINATION
General: non-toxic appearing, in no acute distress, A+Ox3  CV: RRR, nl s1/s2.  Resp: CTAB, normal rate and effort  Neuro: sensorimotor intact without deficits   MSK: No spine tenderness to palpation, Full ROM and strength ext x 4. non-ambulatory   Skin: No rashes, lacerations, abrasions, ecchymosis. intact and perfused.

## 2023-07-16 NOTE — ED PROVIDER NOTE - PATIENT PORTAL LINK FT
You can access the FollowMyHealth Patient Portal offered by [facility] by registering at the following website: [email or web address]. By joining [employer]’s FollowMyHealth portal, you will also be able to view your health information using other applications (apps) compatible with our system.

## 2023-07-16 NOTE — ED ADULT TRIAGE NOTE - NS ED NURSE AMBULANCES
[facility] release

## 2023-07-16 NOTE — ED PROVIDER NOTE - ATTENDING APP SHARED VISIT CONTRIBUTION OF CARE
musculoskeletal low back pain; no incontinence; normal lower ext neuro exam; improved with ED treatment; agree with acp plan of care    This was a shared visit with MEGA. I reviewed and verified the documentation and independently performed the documented history/exam/mdm.

## 2023-12-01 ENCOUNTER — EMERGENCY (EMERGENCY)
Facility: HOSPITAL | Age: 53
LOS: 1 days | Discharge: HOME | End: 2023-12-01
Attending: STUDENT IN AN ORGANIZED HEALTH CARE EDUCATION/TRAINING PROGRAM
Payer: COMMERCIAL

## 2023-12-01 VITALS
TEMPERATURE: 99 F | RESPIRATION RATE: 20 BRPM | HEART RATE: 95 BPM | DIASTOLIC BLOOD PRESSURE: 75 MMHG | OXYGEN SATURATION: 97 % | SYSTOLIC BLOOD PRESSURE: 117 MMHG

## 2023-12-01 LAB
ALBUMIN SERPL ELPH-MCNC: 3.6 G/DL — SIGNIFICANT CHANGE UP (ref 3.3–5.2)
ALBUMIN SERPL ELPH-MCNC: 3.6 G/DL — SIGNIFICANT CHANGE UP (ref 3.3–5.2)
ALP SERPL-CCNC: 53 U/L — SIGNIFICANT CHANGE UP (ref 40–120)
ALP SERPL-CCNC: 53 U/L — SIGNIFICANT CHANGE UP (ref 40–120)
ALT FLD-CCNC: 11 U/L — SIGNIFICANT CHANGE UP
ALT FLD-CCNC: 11 U/L — SIGNIFICANT CHANGE UP
ANION GAP SERPL CALC-SCNC: 10 MMOL/L — SIGNIFICANT CHANGE UP (ref 5–17)
ANION GAP SERPL CALC-SCNC: 10 MMOL/L — SIGNIFICANT CHANGE UP (ref 5–17)
AST SERPL-CCNC: 19 U/L — SIGNIFICANT CHANGE UP
AST SERPL-CCNC: 19 U/L — SIGNIFICANT CHANGE UP
BASOPHILS # BLD AUTO: 0.05 K/UL — SIGNIFICANT CHANGE UP (ref 0–0.2)
BASOPHILS # BLD AUTO: 0.05 K/UL — SIGNIFICANT CHANGE UP (ref 0–0.2)
BASOPHILS NFR BLD AUTO: 0.5 % — SIGNIFICANT CHANGE UP (ref 0–2)
BASOPHILS NFR BLD AUTO: 0.5 % — SIGNIFICANT CHANGE UP (ref 0–2)
BILIRUB SERPL-MCNC: 0.4 MG/DL — SIGNIFICANT CHANGE UP (ref 0.4–2)
BILIRUB SERPL-MCNC: 0.4 MG/DL — SIGNIFICANT CHANGE UP (ref 0.4–2)
BUN SERPL-MCNC: 18 MG/DL — SIGNIFICANT CHANGE UP (ref 8–20)
BUN SERPL-MCNC: 18 MG/DL — SIGNIFICANT CHANGE UP (ref 8–20)
CALCIUM SERPL-MCNC: 8.6 MG/DL — SIGNIFICANT CHANGE UP (ref 8.4–10.5)
CALCIUM SERPL-MCNC: 8.6 MG/DL — SIGNIFICANT CHANGE UP (ref 8.4–10.5)
CHLORIDE SERPL-SCNC: 106 MMOL/L — SIGNIFICANT CHANGE UP (ref 96–108)
CHLORIDE SERPL-SCNC: 106 MMOL/L — SIGNIFICANT CHANGE UP (ref 96–108)
CK MB CFR SERPL CALC: 4.7 NG/ML — SIGNIFICANT CHANGE UP (ref 0–6.7)
CK MB CFR SERPL CALC: 4.7 NG/ML — SIGNIFICANT CHANGE UP (ref 0–6.7)
CK SERPL-CCNC: 209 U/L — HIGH (ref 30–200)
CK SERPL-CCNC: 209 U/L — HIGH (ref 30–200)
CO2 SERPL-SCNC: 24 MMOL/L — SIGNIFICANT CHANGE UP (ref 22–29)
CO2 SERPL-SCNC: 24 MMOL/L — SIGNIFICANT CHANGE UP (ref 22–29)
CREAT SERPL-MCNC: 1.13 MG/DL — SIGNIFICANT CHANGE UP (ref 0.5–1.3)
CREAT SERPL-MCNC: 1.13 MG/DL — SIGNIFICANT CHANGE UP (ref 0.5–1.3)
EGFR: 78 ML/MIN/1.73M2 — SIGNIFICANT CHANGE UP
EGFR: 78 ML/MIN/1.73M2 — SIGNIFICANT CHANGE UP
EOSINOPHIL # BLD AUTO: 0.08 K/UL — SIGNIFICANT CHANGE UP (ref 0–0.5)
EOSINOPHIL # BLD AUTO: 0.08 K/UL — SIGNIFICANT CHANGE UP (ref 0–0.5)
EOSINOPHIL NFR BLD AUTO: 0.7 % — SIGNIFICANT CHANGE UP (ref 0–6)
EOSINOPHIL NFR BLD AUTO: 0.7 % — SIGNIFICANT CHANGE UP (ref 0–6)
GLUCOSE SERPL-MCNC: 109 MG/DL — HIGH (ref 70–99)
GLUCOSE SERPL-MCNC: 109 MG/DL — HIGH (ref 70–99)
HCT VFR BLD CALC: 37.3 % — LOW (ref 39–50)
HCT VFR BLD CALC: 37.3 % — LOW (ref 39–50)
HGB BLD-MCNC: 12.3 G/DL — LOW (ref 13–17)
HGB BLD-MCNC: 12.3 G/DL — LOW (ref 13–17)
IMM GRANULOCYTES NFR BLD AUTO: 0.3 % — SIGNIFICANT CHANGE UP (ref 0–0.9)
IMM GRANULOCYTES NFR BLD AUTO: 0.3 % — SIGNIFICANT CHANGE UP (ref 0–0.9)
LYMPHOCYTES # BLD AUTO: 1.48 K/UL — SIGNIFICANT CHANGE UP (ref 1–3.3)
LYMPHOCYTES # BLD AUTO: 1.48 K/UL — SIGNIFICANT CHANGE UP (ref 1–3.3)
LYMPHOCYTES # BLD AUTO: 13.5 % — SIGNIFICANT CHANGE UP (ref 13–44)
LYMPHOCYTES # BLD AUTO: 13.5 % — SIGNIFICANT CHANGE UP (ref 13–44)
MCHC RBC-ENTMCNC: 27.9 PG — SIGNIFICANT CHANGE UP (ref 27–34)
MCHC RBC-ENTMCNC: 27.9 PG — SIGNIFICANT CHANGE UP (ref 27–34)
MCHC RBC-ENTMCNC: 33 GM/DL — SIGNIFICANT CHANGE UP (ref 32–36)
MCHC RBC-ENTMCNC: 33 GM/DL — SIGNIFICANT CHANGE UP (ref 32–36)
MCV RBC AUTO: 84.6 FL — SIGNIFICANT CHANGE UP (ref 80–100)
MCV RBC AUTO: 84.6 FL — SIGNIFICANT CHANGE UP (ref 80–100)
MONOCYTES # BLD AUTO: 0.75 K/UL — SIGNIFICANT CHANGE UP (ref 0–0.9)
MONOCYTES # BLD AUTO: 0.75 K/UL — SIGNIFICANT CHANGE UP (ref 0–0.9)
MONOCYTES NFR BLD AUTO: 6.9 % — SIGNIFICANT CHANGE UP (ref 2–14)
MONOCYTES NFR BLD AUTO: 6.9 % — SIGNIFICANT CHANGE UP (ref 2–14)
NEUTROPHILS # BLD AUTO: 8.55 K/UL — HIGH (ref 1.8–7.4)
NEUTROPHILS # BLD AUTO: 8.55 K/UL — HIGH (ref 1.8–7.4)
NEUTROPHILS NFR BLD AUTO: 78.1 % — HIGH (ref 43–77)
NEUTROPHILS NFR BLD AUTO: 78.1 % — HIGH (ref 43–77)
PLATELET # BLD AUTO: 252 K/UL — SIGNIFICANT CHANGE UP (ref 150–400)
PLATELET # BLD AUTO: 252 K/UL — SIGNIFICANT CHANGE UP (ref 150–400)
POTASSIUM SERPL-MCNC: 4.1 MMOL/L — SIGNIFICANT CHANGE UP (ref 3.5–5.3)
POTASSIUM SERPL-MCNC: 4.1 MMOL/L — SIGNIFICANT CHANGE UP (ref 3.5–5.3)
POTASSIUM SERPL-SCNC: 4.1 MMOL/L — SIGNIFICANT CHANGE UP (ref 3.5–5.3)
POTASSIUM SERPL-SCNC: 4.1 MMOL/L — SIGNIFICANT CHANGE UP (ref 3.5–5.3)
PROT SERPL-MCNC: 6.3 G/DL — LOW (ref 6.6–8.7)
PROT SERPL-MCNC: 6.3 G/DL — LOW (ref 6.6–8.7)
RBC # BLD: 4.41 M/UL — SIGNIFICANT CHANGE UP (ref 4.2–5.8)
RBC # BLD: 4.41 M/UL — SIGNIFICANT CHANGE UP (ref 4.2–5.8)
RBC # FLD: 14.3 % — SIGNIFICANT CHANGE UP (ref 10.3–14.5)
RBC # FLD: 14.3 % — SIGNIFICANT CHANGE UP (ref 10.3–14.5)
SODIUM SERPL-SCNC: 140 MMOL/L — SIGNIFICANT CHANGE UP (ref 135–145)
SODIUM SERPL-SCNC: 140 MMOL/L — SIGNIFICANT CHANGE UP (ref 135–145)
WBC # BLD: 10.94 K/UL — HIGH (ref 3.8–10.5)
WBC # BLD: 10.94 K/UL — HIGH (ref 3.8–10.5)
WBC # FLD AUTO: 10.94 K/UL — HIGH (ref 3.8–10.5)
WBC # FLD AUTO: 10.94 K/UL — HIGH (ref 3.8–10.5)

## 2023-12-01 PROCEDURE — 93010 ELECTROCARDIOGRAM REPORT: CPT

## 2023-12-01 PROCEDURE — 99285 EMERGENCY DEPT VISIT HI MDM: CPT

## 2023-12-01 PROCEDURE — 71045 X-RAY EXAM CHEST 1 VIEW: CPT | Mod: 26

## 2023-12-01 RX ORDER — KETAMINE HYDROCHLORIDE 100 MG/ML
300 INJECTION INTRAMUSCULAR; INTRAVENOUS ONCE
Refills: 0 | Status: DISCONTINUED | OUTPATIENT
Start: 2023-12-01 | End: 2023-12-01

## 2023-12-01 RX ORDER — MIDAZOLAM HYDROCHLORIDE 1 MG/ML
5 INJECTION, SOLUTION INTRAMUSCULAR; INTRAVENOUS ONCE
Refills: 0 | Status: DISCONTINUED | OUTPATIENT
Start: 2023-12-01 | End: 2023-12-01

## 2023-12-01 RX ADMIN — KETAMINE HYDROCHLORIDE 300 MILLIGRAM(S): 100 INJECTION INTRAMUSCULAR; INTRAVENOUS at 21:30

## 2023-12-01 RX ADMIN — MIDAZOLAM HYDROCHLORIDE 5 MILLIGRAM(S): 1 INJECTION, SOLUTION INTRAMUSCULAR; INTRAVENOUS at 21:13

## 2023-12-01 NOTE — ED ADULT NURSE REASSESSMENT NOTE - NS ED NURSE REASSESS COMMENT FT1
Assistance needed call to bedside for pt.  Pt noted to have aggressive behavior.  Pt punching, kicking and spitting at staff. Security at bedside.  Dr. Suarez called to bedside to evaluate pt.  PT noted to be in a yellow gown and at risk of harm to self and other. PT medicated as per MD orders. Tolerated well. PT placed on monitor, Pulse ox, supplemental O2 and End tidal.  VSS. Will continue with current treatment plan.

## 2023-12-01 NOTE — ED ADULT NURSE NOTE - OBJECTIVE STATEMENT
Patient presents to ED after consumption of K2.  Patient selectively responsive Patient presents to ED after consumption of K2.  Patient selectively responsive.  Will not respond to questioning or light stimulation.  Patient received in yellow gown, belongings removed.  Unable to obtain any information at this time.

## 2023-12-01 NOTE — ED PROVIDER NOTE - ATTENDING CONTRIBUTION TO CARE
I, Carmelina Suarez MD, have reviewed the resident's documentation. After personally examining the patient, getting an independent history, and formulating an MDM, my findings have been added to this documentation as indicated.

## 2023-12-01 NOTE — ED ADULT TRIAGE NOTE - CHIEF COMPLAINT QUOTE
pt BIBA found to unresponsive, breathing on his own, admits to taking k2 and crack as per EMS, pt responding to temp probe in his mouth but refusing to open his eyes or speak to triage nurse.

## 2023-12-01 NOTE — ED PROVIDER NOTE - PROGRESS NOTE DETAILS
patient getting violent attempting to kick and punch staff,  ketamine ordered, unable to place in CC as all bays full. patient on cardica monitor, saturatign well on RA, placed on end tidal c02. Pt results are back, imaging showing no acute processes, can go back home as soon as is able to walk, was eating well and oriented x3 I had received signout on this patient.  I was unable to evaluate patient prior to him eloping from the emergency department.  Sachin Rosales MD.

## 2023-12-01 NOTE — ED PROVIDER NOTE - PHYSICAL EXAMINATION
pt non cooperative, responsive to pain stimulus with trying to punch me, but no following commands. Pt has eyes closed forcefully. Rigidity noticed only when passive movements attempt.   -Intentionally not following comands, getting violent. pt non cooperative, responsive to pain stimulus with trying to punch me, but no following commands. Pt has eyes closed forcefully. Rigidity noticed only when passive movements attempt.   -Intentionally not following comands, getting violent.    PHYSICAL EXAM:   General: thin appearing non toxic  HEENT: NC/AT, PERRLA, holdign eyes forcefuly closed airway patent,   Cardiovascular: regular rate and rhythm, + S1/S2, no murmurs, rubs, gallops appreciated  Respiratory: nonlabored respirations, saturating well on RA  Abdominal: soft, nontender, nondistended, no rebound, guarding or rigidity  Extremities: no LE edema b/l.   Neuro: see above  no obvious signs of trauma  -Carmelina Suarez MD Attending Physician

## 2023-12-01 NOTE — ED PROVIDER NOTE - CLINICAL SUMMARY MEDICAL DECISION MAKING FREE TEXT BOX
52 y/o male not known PMH BIBA to ED unresponsive after K2 consumption. Pt  non cooperative closing eyes tight, voluntary response to pain only not following commands. O sat 97%. Normal Vital signs. Rigidity noticed when passive movements attempt but stopped when intentional movements like retraction to pain.    Plan:  1. Xray  2. CBC, cmp, CK  3. CT head and spine  4. Versed for agitation 52 y/o male not known PMH BIBA to ED unresponsive after K2 consumption. Pt  non cooperative closing eyes tight, voluntary response to pain only not following commands. O sat 97%. Normal Vital signs. Rigidity noticed when passive movements attempt but stopped when intentional movements like retraction to pain.    Plan:  1. Xray  2. CBC, cmp, CK to eval for rhabdo  3. CT head and spine to eval for trauma given unknown histroy  4. Versed initially for agitation    dispo and further interventions pending reassessments

## 2023-12-01 NOTE — ED PROVIDER NOTE - OBJECTIVE STATEMENT
52 y/o male not known PMH BIBA to ED unresponsive after K2 consumption. Pt  non cooperative closing eyes tight, voluntary response to pain only not following commands. O sat 97%. Normal Vital signs. Rigidity noticed when passive movements attempt but stopped when intentional movements like retraction to pain. 54 y/o male unknown PMH BIBA to ED unresponsive after K2 consumption. Pt  non cooperative closing eyes tight, voluntary response to pain only not following commands. O sat 97%. Normal Vital signs. Rigidity noticed when passive movements attempt but stopped when intentional movements like retraction to pain. Not providing any history

## 2023-12-02 VITALS
TEMPERATURE: 97 F | SYSTOLIC BLOOD PRESSURE: 119 MMHG | OXYGEN SATURATION: 97 % | RESPIRATION RATE: 20 BRPM | HEART RATE: 88 BPM | DIASTOLIC BLOOD PRESSURE: 82 MMHG

## 2023-12-02 PROCEDURE — 82550 ASSAY OF CK (CPK): CPT

## 2023-12-02 PROCEDURE — 85025 COMPLETE CBC W/AUTO DIFF WBC: CPT

## 2023-12-02 PROCEDURE — 71045 X-RAY EXAM CHEST 1 VIEW: CPT

## 2023-12-02 PROCEDURE — 82553 CREATINE MB FRACTION: CPT

## 2023-12-02 PROCEDURE — 70450 CT HEAD/BRAIN W/O DYE: CPT | Mod: MG

## 2023-12-02 PROCEDURE — 99285 EMERGENCY DEPT VISIT HI MDM: CPT | Mod: 25

## 2023-12-02 PROCEDURE — G1004: CPT

## 2023-12-02 PROCEDURE — 82962 GLUCOSE BLOOD TEST: CPT

## 2023-12-02 PROCEDURE — 70450 CT HEAD/BRAIN W/O DYE: CPT | Mod: 26,MG

## 2023-12-02 PROCEDURE — 72125 CT NECK SPINE W/O DYE: CPT | Mod: 26,MG

## 2023-12-02 PROCEDURE — 36415 COLL VENOUS BLD VENIPUNCTURE: CPT

## 2023-12-02 PROCEDURE — 72125 CT NECK SPINE W/O DYE: CPT | Mod: MG

## 2023-12-02 PROCEDURE — 96372 THER/PROPH/DIAG INJ SC/IM: CPT

## 2023-12-02 PROCEDURE — 93005 ELECTROCARDIOGRAM TRACING: CPT

## 2023-12-02 PROCEDURE — 80053 COMPREHEN METABOLIC PANEL: CPT

## 2023-12-02 NOTE — ED ADULT NURSE REASSESSMENT NOTE - NS ED NURSE REASSESS COMMENT FT1
Pt laying in bed, resting/sleeping. Pt A&Ox4 in NAD @ this time. RR even & unlabored. Pt awaiting dispo. Pt denies any pain, complaints, or needs @ this time. Safety maintained.

## 2024-03-27 ENCOUNTER — TRANSCRIPTION ENCOUNTER (OUTPATIENT)
Age: 54
End: 2024-03-27

## 2024-03-28 ENCOUNTER — RESULT REVIEW (OUTPATIENT)
Age: 54
End: 2024-03-28

## 2024-03-28 ENCOUNTER — INPATIENT (INPATIENT)
Facility: HOSPITAL | Age: 54
LOS: 0 days | Discharge: HOME | DRG: 62 | End: 2024-03-29
Attending: STUDENT IN AN ORGANIZED HEALTH CARE EDUCATION/TRAINING PROGRAM | Admitting: STUDENT IN AN ORGANIZED HEALTH CARE EDUCATION/TRAINING PROGRAM
Payer: COMMERCIAL

## 2024-03-28 VITALS
SYSTOLIC BLOOD PRESSURE: 118 MMHG | OXYGEN SATURATION: 97 % | TEMPERATURE: 97 F | RESPIRATION RATE: 20 BRPM | HEIGHT: 73 IN | DIASTOLIC BLOOD PRESSURE: 78 MMHG | HEART RATE: 105 BPM | WEIGHT: 153.44 LBS

## 2024-03-28 DIAGNOSIS — Z98.890 OTHER SPECIFIED POSTPROCEDURAL STATES: Chronic | ICD-10-CM

## 2024-03-28 DIAGNOSIS — I63.9 CEREBRAL INFARCTION, UNSPECIFIED: ICD-10-CM

## 2024-03-28 LAB
ALBUMIN SERPL ELPH-MCNC: 3.6 G/DL — SIGNIFICANT CHANGE UP (ref 3.3–5.2)
ALP SERPL-CCNC: 64 U/L — SIGNIFICANT CHANGE UP (ref 40–120)
ALT FLD-CCNC: 13 U/L — SIGNIFICANT CHANGE UP
AMPHET UR-MCNC: NEGATIVE — SIGNIFICANT CHANGE UP
APTT BLD: 28.9 SEC — SIGNIFICANT CHANGE UP (ref 24.5–35.6)
AST SERPL-CCNC: 22 U/L — SIGNIFICANT CHANGE UP
BARBITURATES UR SCN-MCNC: NEGATIVE — SIGNIFICANT CHANGE UP
BASOPHILS # BLD AUTO: 0.05 K/UL — SIGNIFICANT CHANGE UP (ref 0–0.2)
BASOPHILS NFR BLD AUTO: 0.4 % — SIGNIFICANT CHANGE UP (ref 0–2)
BENZODIAZ UR-MCNC: NEGATIVE — SIGNIFICANT CHANGE UP
BILIRUB SERPL-MCNC: 0.3 MG/DL — LOW (ref 0.4–2)
BUN SERPL-MCNC: 17.9 MG/DL — SIGNIFICANT CHANGE UP (ref 8–20)
CALCIUM SERPL-MCNC: 8.7 MG/DL — SIGNIFICANT CHANGE UP (ref 8.4–10.5)
CHLORIDE SERPL-SCNC: 101 MMOL/L — SIGNIFICANT CHANGE UP (ref 96–108)
CO2 SERPL-SCNC: 24 MMOL/L — SIGNIFICANT CHANGE UP (ref 22–29)
COCAINE METAB.OTHER UR-MCNC: POSITIVE
CREAT SERPL-MCNC: 0.97 MG/DL — SIGNIFICANT CHANGE UP (ref 0.5–1.3)
EGFR: 94 ML/MIN/1.73M2 — SIGNIFICANT CHANGE UP
EOSINOPHIL # BLD AUTO: 0.26 K/UL — SIGNIFICANT CHANGE UP (ref 0–0.5)
EOSINOPHIL NFR BLD AUTO: 2.2 % — SIGNIFICANT CHANGE UP (ref 0–6)
GLUCOSE BLDC GLUCOMTR-MCNC: 104 MG/DL — HIGH (ref 70–99)
GLUCOSE BLDC GLUCOMTR-MCNC: 111 MG/DL — HIGH (ref 70–99)
GLUCOSE BLDC GLUCOMTR-MCNC: 82 MG/DL — SIGNIFICANT CHANGE UP (ref 70–99)
GLUCOSE BLDC GLUCOMTR-MCNC: 85 MG/DL — SIGNIFICANT CHANGE UP (ref 70–99)
GLUCOSE SERPL-MCNC: 102 MG/DL — HIGH (ref 70–99)
HCT VFR BLD CALC: 40.8 % — SIGNIFICANT CHANGE UP (ref 39–50)
HGB BLD-MCNC: 13.4 G/DL — SIGNIFICANT CHANGE UP (ref 13–17)
IMM GRANULOCYTES NFR BLD AUTO: 0.3 % — SIGNIFICANT CHANGE UP (ref 0–0.9)
INR BLD: 1.05 RATIO — SIGNIFICANT CHANGE UP (ref 0.85–1.18)
LYMPHOCYTES # BLD AUTO: 2.54 K/UL — SIGNIFICANT CHANGE UP (ref 1–3.3)
LYMPHOCYTES # BLD AUTO: 21.3 % — SIGNIFICANT CHANGE UP (ref 13–44)
MCHC RBC-ENTMCNC: 28 PG — SIGNIFICANT CHANGE UP (ref 27–34)
MCHC RBC-ENTMCNC: 32.8 GM/DL — SIGNIFICANT CHANGE UP (ref 32–36)
MCV RBC AUTO: 85.2 FL — SIGNIFICANT CHANGE UP (ref 80–100)
METHADONE UR-MCNC: NEGATIVE — SIGNIFICANT CHANGE UP
MONOCYTES # BLD AUTO: 1.02 K/UL — HIGH (ref 0–0.9)
MONOCYTES NFR BLD AUTO: 8.6 % — SIGNIFICANT CHANGE UP (ref 2–14)
MRSA PCR RESULT.: SIGNIFICANT CHANGE UP
NEUTROPHILS # BLD AUTO: 8.01 K/UL — HIGH (ref 1.8–7.4)
NEUTROPHILS NFR BLD AUTO: 67.2 % — SIGNIFICANT CHANGE UP (ref 43–77)
OPIATES UR-MCNC: NEGATIVE — SIGNIFICANT CHANGE UP
PCP SPEC-MCNC: SIGNIFICANT CHANGE UP
PCP UR-MCNC: NEGATIVE — SIGNIFICANT CHANGE UP
PLATELET # BLD AUTO: 246 K/UL — SIGNIFICANT CHANGE UP (ref 150–400)
POTASSIUM SERPL-MCNC: 4 MMOL/L — SIGNIFICANT CHANGE UP (ref 3.5–5.3)
POTASSIUM SERPL-SCNC: 4 MMOL/L — SIGNIFICANT CHANGE UP (ref 3.5–5.3)
PROT SERPL-MCNC: 6.9 G/DL — SIGNIFICANT CHANGE UP (ref 6.6–8.7)
PROTHROM AB SERPL-ACNC: 11.6 SEC — SIGNIFICANT CHANGE UP (ref 9.5–13)
RBC # BLD: 4.79 M/UL — SIGNIFICANT CHANGE UP (ref 4.2–5.8)
RBC # FLD: 13.7 % — SIGNIFICANT CHANGE UP (ref 10.3–14.5)
S AUREUS DNA NOSE QL NAA+PROBE: SIGNIFICANT CHANGE UP
SODIUM SERPL-SCNC: 136 MMOL/L — SIGNIFICANT CHANGE UP (ref 135–145)
THC UR QL: NEGATIVE — SIGNIFICANT CHANGE UP
TROPONIN T, HIGH SENSITIVITY RESULT: 7 NG/L — SIGNIFICANT CHANGE UP (ref 0–51)
WBC # BLD: 11.92 K/UL — HIGH (ref 3.8–10.5)
WBC # FLD AUTO: 11.92 K/UL — HIGH (ref 3.8–10.5)

## 2024-03-28 PROCEDURE — 99222 1ST HOSP IP/OBS MODERATE 55: CPT

## 2024-03-28 PROCEDURE — 93010 ELECTROCARDIOGRAM REPORT: CPT

## 2024-03-28 PROCEDURE — 70496 CT ANGIOGRAPHY HEAD: CPT | Mod: 26,MC

## 2024-03-28 PROCEDURE — 99291 CRITICAL CARE FIRST HOUR: CPT

## 2024-03-28 PROCEDURE — 70498 CT ANGIOGRAPHY NECK: CPT | Mod: 26,MC

## 2024-03-28 PROCEDURE — 99291 CRITICAL CARE FIRST HOUR: CPT | Mod: 25

## 2024-03-28 PROCEDURE — 70450 CT HEAD/BRAIN W/O DYE: CPT | Mod: 26,MC,59

## 2024-03-28 PROCEDURE — 0042T: CPT | Mod: MC

## 2024-03-28 RX ORDER — SODIUM CHLORIDE 9 MG/ML
10 INJECTION INTRAMUSCULAR; INTRAVENOUS; SUBCUTANEOUS ONCE
Refills: 0 | Status: COMPLETED | OUTPATIENT
Start: 2024-03-28 | End: 2024-03-28

## 2024-03-28 RX ORDER — DEXTROSE 50 % IN WATER 50 %
25 SYRINGE (ML) INTRAVENOUS ONCE
Refills: 0 | Status: DISCONTINUED | OUTPATIENT
Start: 2024-03-28 | End: 2024-03-29

## 2024-03-28 RX ORDER — ACETAMINOPHEN 500 MG
1000 TABLET ORAL ONCE
Refills: 0 | Status: COMPLETED | OUTPATIENT
Start: 2024-03-28 | End: 2024-03-28

## 2024-03-28 RX ORDER — SODIUM CHLORIDE 9 MG/ML
1000 INJECTION INTRAMUSCULAR; INTRAVENOUS; SUBCUTANEOUS
Refills: 0 | Status: DISCONTINUED | OUTPATIENT
Start: 2024-03-28 | End: 2024-03-28

## 2024-03-28 RX ORDER — DEXTROSE 50 % IN WATER 50 %
12.5 SYRINGE (ML) INTRAVENOUS ONCE
Refills: 0 | Status: DISCONTINUED | OUTPATIENT
Start: 2024-03-28 | End: 2024-03-29

## 2024-03-28 RX ORDER — INSULIN LISPRO 100/ML
VIAL (ML) SUBCUTANEOUS EVERY 6 HOURS
Refills: 0 | Status: DISCONTINUED | OUTPATIENT
Start: 2024-03-28 | End: 2024-03-29

## 2024-03-28 RX ORDER — TENECTEPLASE 50 MG
18 KIT INTRAVENOUS ONCE
Refills: 0 | Status: COMPLETED | OUTPATIENT
Start: 2024-03-28 | End: 2024-03-28

## 2024-03-28 RX ORDER — CHLORHEXIDINE GLUCONATE 213 G/1000ML
1 SOLUTION TOPICAL
Refills: 0 | Status: DISCONTINUED | OUTPATIENT
Start: 2024-03-28 | End: 2024-03-29

## 2024-03-28 RX ADMIN — SODIUM CHLORIDE 10 MILLILITER(S): 9 INJECTION INTRAMUSCULAR; INTRAVENOUS; SUBCUTANEOUS at 02:14

## 2024-03-28 RX ADMIN — TENECTEPLASE 2592 MILLIGRAM(S): KIT at 01:11

## 2024-03-28 RX ADMIN — SODIUM CHLORIDE 10 MILLILITER(S): 9 INJECTION INTRAMUSCULAR; INTRAVENOUS; SUBCUTANEOUS at 01:12

## 2024-03-28 RX ADMIN — SODIUM CHLORIDE 75 MILLILITER(S): 9 INJECTION INTRAMUSCULAR; INTRAVENOUS; SUBCUTANEOUS at 03:24

## 2024-03-28 RX ADMIN — Medication 400 MILLIGRAM(S): at 02:45

## 2024-03-28 RX ADMIN — Medication 1000 MILLIGRAM(S): at 03:05

## 2024-03-28 RX ADMIN — CHLORHEXIDINE GLUCONATE 1 APPLICATION(S): 213 SOLUTION TOPICAL at 05:23

## 2024-03-28 NOTE — H&P ADULT - CRITICAL CARE ATTENDING COMMENT
52 y/o male with unknown PMHx presented to ED as stroke code. s/p TNK  cocaine +    post TNK protocol  CTH in 24h  echo  IVfluid  SW consult    I spent 60 minutes of critical care time examining patient, reviewing vitals, labs, medications, imaging and discussing with the team goals of care to prevent life-threatening in this patient who is at high risk for deterioration or death due to:  stroke

## 2024-03-28 NOTE — ED ADULT NURSE NOTE - NSFALLHARMRISKINTERV_ED_ALL_ED
Assistance OOB with selected safe patient handling equipment if applicable/Assistance with ambulation/Communicate risk of Fall with Harm to all staff, patient, and family/Monitor gait and stability/Provide visual cue: red socks, yellow wristband, yellow gown, etc/Reinforce activity limits and safety measures with patient and family/Bed in lowest position, wheels locked, appropriate side rails in place/Call bell, personal items and telephone in reach/Instruct patient to call for assistance before getting out of bed/chair/stretcher/Non-slip footwear applied when patient is off stretcher/[place] to call system/Physically safe environment - no spills, clutter or unnecessary equipment/Purposeful Proactive Rounding/Room/bathroom lighting operational, light cord in reach

## 2024-03-28 NOTE — SPEECH LANGUAGE PATHOLOGY EVALUATION - SLP DIAGNOSIS
Primary receptive language skills assessed to be WFL. Mild-moderate expressive language deficits characterized by word finding deficits noted in conversational discourse. Pt reports being aware of difficulties and utilizes short phrases for conversation.  Pt w/ fair ability to complete structured tasks however, increased time required. Low vocal volume noted, ?poor breath support. No dysarthria observed, although pt states he feels his speech is slurred. Higher level cognitive linguistic skills informally assessed to be WFL, although formal screening/testing required.

## 2024-03-28 NOTE — ED PROVIDER NOTE - ATTENDING CONTRIBUTION TO CARE
Edu: I performed a face to face evaluation of this patient and performed a full history and physical examination on the patient.  I agree with the resident's history, physical examination, and plan of the patient unless otherwise noted. My brief assessment is as follows: 52 y/o male no pmh p/w acute onset of numbness/weakness to left side with some difficulty speaking. was playing with daughter when noticed symptoms around midnight. code stroke activated from triage at 1249 which is when I evaluated him. dysarthria, unable to lift leg arm, significant weakness left leg, unable to ambulate. no sensation to left hand. fs wnl. bp wnl. no a/c, no hx bleeding. no recent procedure. NIHSS 8 spoke to Dr. Morales stroke neurologist, agrees with tnk. 2 clinician verification including consent from pt after risk/benefit/alternatives discusison, dosing confirmed with pharmacy. admitted to neuro ICU. Edu: I performed a face to face evaluation of this patient and performed a full history and physical examination on the patient.  I agree with the resident's history, physical examination, and plan of the patient unless otherwise noted. My brief assessment is as follows: 54 y/o male no pmh p/w acute onset of numbness/weakness to left side with some difficulty speaking. was playing with daughter when noticed symptoms around midnight. code stroke activated from triage at 1249 which is when I evaluated him. dysarthria, unable to lift leg arm, significant weakness left leg, unable to ambulate. no sensation to left hand. fs wnl. bp wnl. no a/c, no hx bleeding. no recent procedure. NIHSS 8 spoke to Dr. Morales stroke neurologist, agrees with tnk. 2 clinician verification including consent from pt after risk/benefit/alternatives discusison, dosing confirmed with pharmacy. tnk given at 1:11a admitted to neuro ICU. Edu: I performed a face to face evaluation of this patient and performed a full history and physical examination on the patient.  I agree with the resident's history, physical examination, and plan of the patient unless otherwise noted. My brief assessment is as follows: 52 y/o male no pmh p/w acute onset of numbness/weakness to left side with some difficulty speaking. was playing with daughter when noticed symptoms around midnight. code stroke activated from triage at 1249 which is when I evaluated him. dysarthria, unable to lift leg arm, significant weakness left leg, unable to ambulate. no sensation to left hand. fs wnl. bp wnl. no a/c, no hx bleeding. no recent procedure. NIHSS 8 spoke to Dr. Morales stroke neurologist, agrees with tnk. 2 clinician verification including consent from pt after risk/benefit/alternatives discusison, dosing confirmed with pharmacy. tnk given at 1:11a. >30 minutes from arrival but ~22 minutes from provider assessment. admitted to neuro ICU.

## 2024-03-28 NOTE — CONSULT NOTE ADULT - SUBJECTIVE AND OBJECTIVE BOX
53yM was admitted on 03-28    Patient is a 53y old  Male who presents with a chief complaint of stroke (28 Mar 2024 02:14)    HPI:  Mr. Martinez is a 53 year old male with unknown PMHx presented to ED as stroke code. As per ED documentation patient LKN 12AM and developed acute onset L facial droop, L facial numbness, LUE/LLE weakness and numbness. Stroke code imaging negative, inital NIHSS 8, given tenecteplase at 1:12AM. This morning he still feels weak, thinks some strength is improving.  Able to answer yes/no questions.        Imaging reviewed showed:    ACC: [id number] EXAM:  CT BRAIN STROKE PROTOCOL   ORDERED BY: ZENY QUINN     PROCEDURE DATE:  03/28/2024          INTERPRETATION:  CLINICAL INDICATION:  Stroke Code    TECHNIQUE: Noncontrast CT examination of the head was performed.  Coronal and sagittal reformats were also obtained.    COMPARISON: 12/2/2023    FINDINGS:  INTRA-AXIAL: No intracranial mass effect, acute hemorrhage, midline shift   or acute transcortical infarct is seen.  EXTRA-AXIAL: No extra-axial fluid collection is present.  VENTRICLES AND SULCI: Parenchymal volume is commensurate with patient   age. No hydrocephalus.  VISUALIZED SINUSES: Mild mucosal thickening.  VISUALIZED MASTOIDS: Clear.  CALVARIUM: Normal.    IMPRESSION:    No evidence of acute intracranial hemorrhage, midline shift or CT   evidence of acute territorial infarct.    If the patient's symptoms persist, consider short interval follow-up head   CT or brain MRI if there are no MRI contraindications.      ACC: [id number] EXAM:  CT ANGIO BRAIN STROKE PROTC IC   ORDERED BY: ZENY QUINN     ACC: [id number] EXAM:  CT ANGIO NECK STROKE PROTCL IC   ORDERED BY: ZENY QUINN     ACC: [id number] EXAM:  CT BRAIN PERFUSION MAPS STROKE   ORDERED BY: ZENY QUINN     PROCEDURE DATE:  03/28/2024          INTERPRETATION:  CLINICAL INDICATION: Stroke code.    TECHNIQUE:    CTA [place] OF [place]:    After the intravenous power injection of non-ionic contrast material,   serial thin sections were obtained through the intracranial circulation   on a multislice CT scanner.  Images were reformatted using a dedicated 3D   software package and viewed on a dedicated workstation in multiple   planes. 90 cc Omnipaque 350 administered and 10 cc discarded.    CTA NECK:    After the intravenous power injection of non-ionic contrast material,   serial thin sections were obtained through the cervical circulation on a   multislice CT scanner.  Images were reformatted using a dedicated 3D   software package and viewed on a dedicated workstation in multiple planes.      COMPARISON EXAMINATION: None.    FINDINGS:    CT RAPID PERFUSION:    INFARCT CORE: 0 mL    TISSUE AT RISK: 0 mL, Tmax >6s.    MISMATCH RATIO: None          CTA [place] OF [place]:    ANTERIOR CIRCULATION    ICA  CAVERNOUS, SUPRACLINOID, BIFURCATION SEGMENTS: Patent without flow   limiting stenosis.    ANTERIOR CEREBRAL ARTERIES: Bilateral A1, anterior communicating and A2   anterior cerebral arteries are unremarkable in course and caliber without   flow limiting stenosis.    MIDDLE CEREBRAL ARTERIES: Patent bilateral M1, M2, and distal MCA   branches without flow limiting stenosis.    POSTERIOR CIRCULATION:    VERTEBRAL ARTERIES: Patent without flow limiting stenosis    BASILAR ARTERY: Patent no flow limiting stenosis.    POSTERIOR CEREBRAL ARTERIES: Patent without flow limiting stenosis.    CTA NECK:    GREAT VESSELS: Visualized segments are patent, no flow limiting stenosis.    COMMON CAROTID ARTERIES:  RIGHT: Patent without flow limiting stenosis  LEFT: Patent without flow limiting stenosis    INTERNAL CAROTID ARTERIES:  RIGHT: Patent no evidence for any hemodynamically significant stenosis at   the ICA origin by NASCET criteria.  LEFT: Patent no evidence for any hemodynamically significant stenosis at   the ICA origin by NASCET criteria.    VERTEBRAL ARTERIES:    RIGHT: Patent no evidence for any flow limiting stenosis.  LEFT: Patent no evidence for any flow limiting stenosis.      SOFT TISSUES: Emphysema    BONES: Mild degenerative changes      IMPRESSION:    CT PERFUSION: No core infarct or penumbra.    If symptoms persist consider follow up head CT or MRI, MRA  if no   contraindication.    CTA COW:  Patent intracranial circulation without flow limiting stenosis    CTA NECK: Patent, ECAs, ICAs, no  hemodynamically significant stenosis at    ICA origins by NASCET criteria.    Bilateral vertebral arteries are patent without flow limiting stenosis.          REVIEW OF SYSTEMS  Difficult to obtain    VITALS  T(C): 36.5 (03-28-24 @ 08:03), Max: 36.8 (03-28-24 @ 03:48)  HR: 65 (03-28-24 @ 07:41) (63 - 105)  BP: 111/82 (03-28-24 @ 07:41) (106/81 - 129/89)  RR: 14 (03-28-24 @ 07:41) (10 - 24)  SpO2: 99% (03-28-24 @ 07:41) (97% - 100%)  Wt(kg): --    PAST MEDICAL & SURGICAL HISTORY  No pertinent past medical history    No significant past surgical history    History of heart surgery        SOCIAL HISTORY - as per documentation/history  Smoking - None  EtOH - None  Drugs - None      CURRENT FUNCTIONAL STATUS  Pending evaluation       RECENT LABS - Reviewed  CBC Full  -  ( 28 Mar 2024 00:50 )  WBC Count : 11.92 K/uL  RBC Count : 4.79 M/uL  Hemoglobin : 13.4 g/dL  Hematocrit : 40.8 %  Platelet Count - Automated : 246 K/uL  Mean Cell Volume : 85.2 fl  Mean Cell Hemoglobin : 28.0 pg  Mean Cell Hemoglobin Concentration : 32.8 gm/dL  Auto Neutrophil # : 8.01 K/uL  Auto Lymphocyte # : 2.54 K/uL  Auto Monocyte # : 1.02 K/uL  Auto Eosinophil # : 0.26 K/uL  Auto Basophil # : 0.05 K/uL  Auto Neutrophil % : 67.2 %  Auto Lymphocyte % : 21.3 %  Auto Monocyte % : 8.6 %  Auto Eosinophil % : 2.2 %  Auto Basophil % : 0.4 %    03-28    136  |  101  |  17.9  ----------------------------<  102<H>  4.0   |  24.0  |  0.97    Ca    8.7      28 Mar 2024 00:50    TPro  6.9  /  Alb  3.6  /  TBili  0.3<L>  /  DBili  x   /  AST  22  /  ALT  13  /  AlkPhos  64  03-28    Urinalysis Basic - ( 28 Mar 2024 00:50 )    Color: x / Appearance: x / SG: x / pH: x  Gluc: 102 mg/dL / Ketone: x  / Bili: x / Urobili: x   Blood: x / Protein: x / Nitrite: x   Leuk Esterase: x / RBC: x / WBC x   Sq Epi: x / Non Sq Epi: x / Bacteria: x        ALLERGIES  penicillin (Unknown)      MEDICATIONS   chlorhexidine 2% Cloths 1 Application(s) Topical <User Schedule>  dextrose 50% Injectable 25 Gram(s) IV Push once  dextrose 50% Injectable 25 Gram(s) IV Push once  dextrose 50% Injectable 12.5 Gram(s) IV Push once  insulin lispro (ADMELOG) corrective regimen sliding scale   SubCutaneous every 6 hours  sodium chloride 0.9%. 1000 milliLiter(s) IV Continuous <Continuous>      ----------------------------------------------------------------------------------------  PHYSICAL EXAM  Constitutional - NAD, Comfortable  HEENT - NCAT, EOMI  Neck - Supple, No limited ROM  Chest - Breathing comfortably, No wheezing  Cardiovascular - No cyanosis   Abdomen - Soft   Extremities - No C/C/E, No calf tenderness   Neurologic Exam -                    Cognitive - Awake, Alert, AAO to self, place, date, year, situation with yes/no prompting, follows commands       Communication - Expressive aphasia      Motor - 5/5 in RUE and RLE, LUE and LLE 1/5     Sensory - Intact to LT  Psychiatric - Mood stable, Affect WNL  ----------------------------------------------------------------------------------------  ASSESSMENT/PLAN  53yMale with functional deficits after CVA  CVA - s/p tenecteplase  Pain - Tylenol  DVT PPX - SCDs  Aphasia - Consult SLP  Rehab/Impaired mobility - Patient being medically optimized.  Consult PT/OT when medically appropriate.  Rehabilitation team will continue to follow as patients condition progresses.      Will continue to follow. Functional progress will determine ongoing rehab dispo recommendations, which may change.    Continue bedside therapy as well as OOB throughout the day with mobilization by staff to maintain cardiopulmonary function and prevention of secondary complications related to debility.

## 2024-03-28 NOTE — DISCHARGE NOTE NURSING/CASE MANAGEMENT/SOCIAL WORK - PATIENT PORTAL LINK FT
You can access the FollowMyHealth Patient Portal offered by [facility] by registering at the following website: [email or web address]. By joining [facility]’s FollowMyHealth portal, you will also be able to view your health information using other applications (apps) compatible with our system.

## 2024-03-28 NOTE — ED ADULT NURSE NOTE - OBJECTIVE STATEMENT
Patient presents as code stroke for aphasia, left sided numbness/weakness beginning around midnight tonight.  Pt A&Ox4, able to answer yes/no questions, left sided hemiparesis, facial asymmetry, PERRLA, pt globally aphasic with complete numbness to left side and left sided vision loss.  Initial NIH 21 as charted.  Pt consented with nodding in agreement and right hand "thumbs up" to TNK administration with the writer and 2 MDs present at bedside.

## 2024-03-28 NOTE — H&P ADULT - NSHPLABSRESULTS_GEN_ALL_CORE
LABS:                        13.4   11.92 )-----------( 246      ( 28 Mar 2024 00:50 )             40.8     03-28    136  |  101  |  17.9  ----------------------------<  102<H>  4.0   |  24.0  |  0.97    Ca    8.7      28 Mar 2024 00:50    TPro  6.9  /  Alb  3.6  /  TBili  0.3<L>  /  DBili  x   /  AST  22  /  ALT  13  /  AlkPhos  64  03-28  PT/INR - ( 28 Mar 2024 00:50 )   PT: 11.6 sec;   INR: 1.05 ratio    PTT - ( 28 Mar 2024 00:50 )  PTT:28.9 sec  Urinalysis Basic - ( 28 Mar 2024 00:50 )  Color: x / Appearance: x / SG: x / pH: x  Gluc: 102 mg/dL / Ketone: x  / Bili: x / Urobili: x   Blood: x / Protein: x / Nitrite: x   Leuk Esterase: x / RBC: x / WBC x   Sq Epi: x / Non Sq Epi: x / Bacteria: x      CAPILLARY BLOOD GLUCOSE  POCT Blood Glucose.: 114 mg/dL (28 Mar 2024 00:53)      RADIOLOGY & ADDITIONAL TESTS:  < from: CT Brain Perfusion Maps Stroke (03.28.24 @ 01:09) >  IMPRESSION:  CT PERFUSION: No core infarct or penumbra.    CTA COW:  Patent intracranial circulation without flow limiting stenosis    CTA NECK: Patent, ECAs, ICAs, no  hemodynamically significant stenosis at    ICA origins by NASCET criteria.  Bilateral vertebral arteries are patent without flow limiting stenosis.    < from: CT Brain Stroke Protocol (03.28.24 @ 01:02) >  IMPRESSION:  No evidence of acute intracranial hemorrhage, midline shift or CT   evidence of acute territorial infarct.  If the patient's symptoms persist, consider short interval follow-up head   CT or brain MRI if there are no MRI contraindications.

## 2024-03-28 NOTE — ED PROVIDER NOTE - NIH STROKE SCALE: 2. BEST GAZE, QM
low salt and low cholesterol (0) Normal Monitor R radial artery for s/s of bleeding. Review discharge instructions and f/u appointments.

## 2024-03-28 NOTE — H&P ADULT - ASSESSMENT
ASSESSMENT:   52 y/o male with unknown PMHx presented to ED as stroke code. As per ED documentation patient LKN 12AM and developed acute onset L facial droop, L facial numbness, LUE/LLE weakness and numbness. Stroke code imaging negative, inital NIHSS 8, given tenecteplase at 1:12AM. At the time of examination NIHSS 15, patient had expressive aphasia with inability to talk.      PLAN:   Neuro:  - Q1 hour Neuro checks, Q1 hour Vitals  - HOB 30 degrees, Neck midline position  - Maintain normothermia, PO acetaminophen for temp>38 C or pain  - stroke core measures   - repeat CTH 3/29 1:12AM  - pending MRI brain and c-spine without contrast   	  CV:  - SBP Goal 110-180     Pulm:  - Supplemental O2 PRN to maintain Spo2>92%    GI:  - NPO   	  Gu:  - Monitor Electrolytes & Renal Function  - NS@75     Heme:  - Monitor H&H  - DVT: SCDs  - pending b/l LE duplex   	  ID:  - Monitor WBC and Temperature	    Endo  - Monitor BGL, maintain <180  - ISS        D/w Dr. Rivera    ASSESSMENT:   52 y/o male with unknown PMHx presented to ED as stroke code. As per ED documentation patient LKN 12AM and developed acute onset L facial droop, L facial numbness, LUE/LLE weakness and numbness. Stroke code imaging negative, inital NIHSS 8, given tenecteplase at 1:12AM. At the time of examination NIHSS 15, patient had expressive aphasia with inability to talk.      PLAN:   Neuro:  - Q1 hour Neuro checks, Q1 hour Vitals  - HOB 30 degrees, Neck midline position  - Maintain normothermia, PO acetaminophen for temp>38 C or pain  - stroke core measures   - repeat CTH 3/29 1:12AM  - pending MRI brain and c-spine without contrast   	  CV:  - SBP Goal 110-180     Pulm:  - Supplemental O2 PRN to maintain Spo2>92%    GI:  - NPO, bedside dysphagia  - sp/sw eval  	  Gu:  - Monitor Electrolytes & Renal Function  - NS@75   - voiding    Heme:  - Monitor H&H  - DVT: SCDs  - pending b/l LE duplex   	  ID:  - Monitor WBC and Temperature	    Endo  - Monitor BGL, maintain <180  - ISS

## 2024-03-28 NOTE — PHARMACOTHERAPY INTERVENTION NOTE - COMMENTS
Code Stroke Attended
Patient provided list of medications and reports all meds he fills at [facility]. [facility] reports, patient has not filled with them since 2021 except aspirin and atorvastatin recently. Message left for  Ken to confirm meds

## 2024-03-28 NOTE — ED ADULT NURSE NOTE - ED STAT RN HANDOFF DETAILS
Patient transferred to neuro ICU without incident, remains NIH 21 as charted, no signs of bleeding noted s/p TNK at 01:11, no change in neuro status.  IV intact.

## 2024-03-28 NOTE — SPEECH LANGUAGE PATHOLOGY EVALUATION - SLP THERAPY FREQUENCY
Protocol For Protocol For Photochemotherapy For Severe Photoresponsive Dermatoses: Bath Puva: The patient received Photochemotherapy for severe photoresponsive dermatoses: Bath PUVA requiring at least 4 to 8 hours of care under direct physician supervision. Protocol For Bath Puva: The patient received Bath PUVA. Protocol For Photochemotherapy: Triamcinolone Ointment And Nbuvb: The patient received Photochemotherapy: Triamcinolone and NBUVB (triamcinolone ointment applied to all lesions prior to phototherapy). Protocol For Photochemotherapy For Severe Photoresponsive Dermatoses: Petrolatum And Nbuvb: The patient received Photochemotherapy for severe photoresponsive dermatoses: Petrolatum and NBUVB requiring at least 4 to 8 hours of care under direct physician supervision. Protocol For Photochemotherapy For Severe Photoresponsive Dermatoses: Tar And Nbuvb (Goeckerman Treatment): The patient received Photochemotherapy for severe photoresponsive dermatoses: Tar and NBUVB (Goeckerman treatment) requiring at least 4 to 8 hours of care under direct physician supervision. as schedule permits Protocol: NBUVB Protocol For Photochemotherapy: Baby Oil And Nbuvb: The patient received Photochemotherapy: Baby Oil and NBUVB (baby oil applied to all lesions prior to phototherapy). Total Body Time: 4:01 Detail Level: Detailed Consent: Written consent obtained.  The risks were reviewed with the patient including but not limited to: burn, pigmentary changes, pain, blistering, scabbing, redness, increased risk of skin cancers, and the remote possibility of scarring. Protocol For Broad Band Uvb: The patient received Broad Band UVB. Post-Care Instructions: I reviewed with the patient in detail post-care instructions. Patient is to wear sun protection. Patients may expect sunburn like redness, discomfort and scabbing. Protocol For Nb Uva: The patient received NB UVA. Treatment Number: [id number] Protocol For Photochemotherapy: Tar And Broad Band Uvb (Goeckerman Treatment): The patient received Photochemotherapy: Tar and Broad Band UVB (Goeckerman treatment). Protocol For Nbuvb: The patient received NBUVB. Protocol For Photochemotherapy: Petrolatum And Broad Band Uvb: The patient received Photochemotherapy: Petrolatum and Broad Band UVB. Protocol For Photochemotherapy: Petrolatum And Nbuvb: The patient received Photochemotherapy: Petrolatum and NBUVB (petrolatum applied to all lesions prior to phototherapy). Protocol For Photochemotherapy For Severe Photoresponsive Dermatoses: Tar And Broad Band Uvb (Goeckerman Treatment): The patient received Photochemotherapy for severe photoresponsive dermatoses: Tar and Broad Band UVB (Goeckerman treatment) requiring at least 4 to 8 hours of care under direct physician supervision. Protocol For Photochemotherapy For Severe Photoresponsive Dermatoses: Petrolatum And Broad Band Uvb: The patient received Photochemotherapyfor severe photoresponsive dermatoses: Petrolatum and Broad Band UVB requiring at least 4 to 8 hours of care under direct physician supervision. Protocol For Photochemotherapy: Tar And Nbuvb (Goeckerman Treatment): The patient received Photochemotherapy: Tar and NBUVB (Goeckerman treatment). Protocol For Puva: The patient received PUVA. Total Body Energy: 700 Protocol For Photochemotherapy For Severe Photoresponsive Dermatoses: Puva: The patient received Photochemotherapy for severe photoresponsive dermatoses: PUVA requiring at least 4 to 8 hours of care under direct physician supervision. Protocol For Photochemotherapy: Mineral Oil And Nbuvb: The patient received Photochemotherapy: Mineral Oil and NBUVB (mineral oil applied to all lesions prior to phototherapy).

## 2024-03-28 NOTE — ED PROVIDER NOTE - OBJECTIVE STATEMENT
53 year old male w/noPMHx presents to the ED with numbness. Code stroke called on triage at 12:50. On physician assessment, patient states symptoms started suddenly at ~12AM, unable to move left upper and lower extremity with numbness. Associated with dysarthria and left facial numbness/paralysis. Patient unable to move the upper or lower extremity. Left sided facial droop noted with decreased sensation over the left face, upper and lower extremity. Unable to ambulate. Intact sensation and motor responses on the right side. Intact visual fields. Oriented to time and place. Currently not on any medication.

## 2024-03-28 NOTE — SPEECH LANGUAGE PATHOLOGY EVALUATION - SLP PERTINENT HISTORY OF CURRENT PROBLEM
As per MD note: "54 y/o male with unknown PMHx presented to ED as stroke code. As per ED documentation patient LKN 12AM 3/28/24 and developed acute onset L facial droop, L facial numbness, LUE/LLE weakness and numbness. Stroke code imaging negative, inital NIHSS 8, given tenecteplase at 1:12AM 3/28/24. At the time of examination NIHSS 15, patient had expressive aphasia with inability to talk. "

## 2024-03-28 NOTE — CONSULT NOTE ADULT - SUBJECTIVE AND OBJECTIVE BOX
Preliminary note, official note pending attending review/signature.  Seen and examined by Stroke team attending/team, assessment/ plan as discussed with stroke team attending/team as noted.                                [facility] Stroke Team    CC:  HPI:  52 y/o male with unknown PMHx presented to ED as stroke code. As per ED documentation patient LKN 12AM and developed acute onset L facial droop, L facial numbness, LUE/LLE weakness and numbness. Stroke code imaging negative, inital NIHSS 8, given tenecteplase at 1:12AM. At the time of examination NIHSS 15, patient had expressive aphasia with inability to talk.      PAST MEDICAL & SURGICAL HISTORY:  No pertinent past medical history  History of heart surgery    MEDICATIONS  (STANDING):  chlorhexidine 2% Cloths 1 Application(s) Topical <User Schedule>  dextrose 50% Injectable 25 Gram(s) IV Push once  dextrose 50% Injectable 25 Gram(s) IV Push once  dextrose 50% Injectable 12.5 Gram(s) IV Push once  insulin lispro (ADMELOG) corrective regimen sliding scale   SubCutaneous every 6 hours  sodium chloride 0.9%. 1000 milliLiter(s) (75 mL/Hr) IV Continuous <Continuous>    MEDICATIONS  (PRN):      Allergies  penicillin (Unknown)    Intolerances    SOCIAL HISTORY:  TOB  ETOH  drugs + cocaine     FAMILY HISTORY:  PENDING       ROS: 14 point ROS negative other than what is present in HPI or below    Vital Signs Last 24 Hrs  T(C): 36.5 (28 Mar 2024 08:03), Max: 36.8 (28 Mar 2024 03:48)  T(F): 97.7 (28 Mar 2024 08:03), Max: 98.2 (28 Mar 2024 03:48)  HR: 65 (28 Mar 2024 08:11) (63 - 105)  BP: 118/83 (28 Mar 2024 08:11) (106/81 - 129/89)  BP(mean): 93 (28 Mar 2024 08:11) (83 - 99)  RR: 11 (28 Mar 2024 08:11) (10 - 24)  SpO2: 100% (28 Mar 2024 08:11) (97% - 100%)    Parameters below as of 28 Mar 2024 07:41  Patient On (Oxygen Delivery Method): room air      EXAM PENDING     Physical Exam:  General: No acute distress.   HEENT: Head normocephalic, atraumatic. Conjunctivae clear w/o exudates or hemorrhage. Sclera non-icteric. Nares are patent bilaterally. Mucous membranes pink and moist.  No tonsillar swelling or exudates.    Neck: Supple, no adenopathy. Trachea midline. No JVD.  Cardiac: Normal rate and rhythm. S1, S2 auscultated. No murmurs, gallops, or rubs.     Respiratory: Lung sounds clear in all fields. Chest wall symmetric, nontender.   Abdominal: Soft, nondistended, nontender. Bowel sounds normoactive x 4 quadrants. No masses, hepatomegaly, or splenomegaly.   Skin: Skin is warm, dry and intact without rashes or lesions. Appropriate color for ethnicity. Nailbeds pink with no cyanosis or clubbing.   Extremities: No edema, 2+ peripheral pulses in b/l upper and b/l lower extremities. Full range of motion in all joints.     Detailed Neurologic Exam:    Mental status: The patient is awake and alert and has normal attention span.  The patient is fully oriented in 3 spheres. The patient is oriented to current events. The patient is able to name objects, follow commands, repeat sentences.    Cranial nerves: Pupils equal and react symmetrically to light. There is no visual field deficit to confrontation. Extraocular motion is full with no nystagmus. There is no ptosis. Facial sensation is intact. Facial musculature is symmetric. Palate elevates symmetrically. Tongue is midline.    Motor: There is normal bulk and tone.  There is no tremor.  Strength is 5/5 in the right arm and leg.   Strength is 5/5 in the left arm and leg.    Sensation: Intact to light touch and pin in 4 extremities    Reflexes: 1-2+ throughout and plantar responses are flexor.    Cerebellar: There is no dysmetria on finger to nose testing.    Gait : deferred    [facility] SS:  DATE: 3/28/24  TIME:  1A: Level of consciousness (0-3):   1B: Questions (0-2):   1C: Commands (0-2):   2: Gaze (0-2):   3: Visual fields (0-3):   4: Facial palsy (0-3):   MOTOR:  5A: Left arm motor drift (0-4):   5B: Right arm motor drift (0-4):   6A: Left leg motor drift (0-4):   6B: Right leg motor drift (0-4):   7: Limb ataxia (0-2):   SENSORY:  8: Sensation (0-2):   SPEECH:  9: Language (0-3):   10: Dysarthria (0-2):   EXTINCTION:  11: Extinction/inattention (0-2):     TOTAL SCORE:     prehospital mRS=      LABS:                         13.4   11.92 )-----------( 246      ( 28 Mar 2024 00:50 )             40.8       03-28    136  |  101  |  17.9  ----------------------------<  102<H>  4.0   |  24.0  |  0.97    Ca    8.7      28 Mar 2024 00:50    TPro  6.9  /  Alb  3.6  /  TBili  0.3<L>  /  DBili  x   /  AST  22  /  ALT  13  /  AlkPhos  64  03-28      PT/INR - ( 28 Mar 2024 00:50 )   PT: 11.6 sec;   INR: 1.05 ratio         PTT - ( 28 Mar 2024 00:50 )  PTT:28.9 sec        A1C: PENDING  LDL: PENDING         RADIOLOGY & ADDITIONAL STUDIES (independently reviewed unless otherwise noted):    (03.28.24 @ 01:09)   CT PERFUSION: No core infarct or penumbra.  If symptoms persist consider follow up head CT or MRI, MRA  if no   contraindication.  CTA COW:  Patent intracranial circulation without flow limiting stenosis  CTA NECK: Patent, ECAs, ICAs, no  hemodynamically significant stenosis at    ICA origins by NASCET criteria.  Bilateral vertebral arteries are patent without flow limiting stenosis.    CT Brain Stroke Protocol (03.28.24 @ 01:02)   No evidence of acute intracranial hemorrhage, midline shift or CT   evidence of acute territorial infarct.       Preliminary note, official note pending attending review/signature.  Seen and examined by Stroke team attending/team, assessment/ plan as discussed with stroke team attending/team as noted.                                [facility] Stroke Team    CC: left sided weakness     HPI:  54 y/o male with unknown PMHx presented to ED as stroke code. As per ED documentation patient LKN 12AM 3/28/24 and developed acute onset L facial droop, L facial numbness, LUE/LLE weakness and numbness. Stroke code imaging negative, inital NIHSS 8, given tenecteplase at 1:12AM 3/28/24. At the time of examination NIHSS 15, patient had expressive aphasia with inability to talk.      PAST MEDICAL & SURGICAL HISTORY:  No pertinent past medical history  History of heart surgery    MEDICATIONS  (STANDING):  chlorhexidine 2% Cloths 1 Application(s) Topical <User Schedule>  dextrose 50% Injectable 25 Gram(s) IV Push once  dextrose 50% Injectable 25 Gram(s) IV Push once  dextrose 50% Injectable 12.5 Gram(s) IV Push once  insulin lispro (ADMELOG) corrective regimen sliding scale   SubCutaneous every 6 hours  sodium chloride 0.9%. 1000 milliLiter(s) (75 mL/Hr) IV Continuous <Continuous>    MEDICATIONS  (PRN):      Allergies  penicillin (Unknown)    Intolerances    SOCIAL HISTORY:  Pending Nictoine /etoh use?  drugs + cocaine     FAMILY HISTORY:  PENDING     ROS: 14 point ROS negative other than what is present in HPI or below    Vital Signs Last 24 Hrs  T(C): 36.5 (28 Mar 2024 08:03), Max: 36.8 (28 Mar 2024 03:48)  T(F): 97.7 (28 Mar 2024 08:03), Max: 98.2 (28 Mar 2024 03:48)  HR: 65 (28 Mar 2024 08:11) (63 - 105)  BP: 118/83 (28 Mar 2024 08:11) (106/81 - 129/89)  BP(mean): 93 (28 Mar 2024 08:11) (83 - 99)  RR: 11 (28 Mar 2024 08:11) (10 - 24)  SpO2: 100% (28 Mar 2024 08:11) (97% - 100%)    Parameters below as of 28 Mar 2024 07:41  Patient On (Oxygen Delivery Method): room air        Physical Exam:  General: No acute distress.     Detailed Neurologic Exam:    Mental status: The patient is awake and alert and has normal attention span.  The patient is fully oriented in 3 spheres. The patient is oriented to current events. The patient is able to name objects, follow commands, repeat sentences. (all by writing)    Cranial nerves: generates few short sounds initiating a word: possibly anarthria vs. apraxia, Pupils equal and react symmetrically to light. left facial palsy, There is no visual field deficit to confrontation. Extraocular motion is full with no nystagmus aside from a few beats on left gaze. There is no ptosis. Facial sensation is intact.      Motor: There is normal bulk and tone.  There is no tremor.  Strength is 5/5 in the right arm and leg.   Strength is 3/5 in left arm and leg with step wise drift to bed in <10 seconds in arm and < 5 seconds in leg.     Sensation: Intact to light touch and pin in 4 extremities    Cerebellar: There is no dysmetria on finger to nose testing.    Gait : deferred    [facility] SS:  DATE: 3/28/24  TIME: 0915  1A: Level of consciousness (0-3):   1B: Questions (0-2):   1C: Commands (0-2):   2: Gaze (0-2):   3: Visual fields (0-3):   4: Facial palsy (0-3): 1  MOTOR:  5A: Left arm motor drift (0-4): 2  5B: Right arm motor drift (0-4):   6A: Left leg motor drift (0-4): 2  6B: Right leg motor drift (0-4):   7: Limb ataxia (0-2):   SENSORY:  8: Sensation (0-2):   SPEECH:  9: Language (0-3): 0  10: Dysarthria (0-2): 2  EXTINCTION:  11: Extinction/inattention (0-2):     TOTAL SCORE: 7    prehospital mRS= un      LABS:                         13.4   11.92 )-----------( 246      ( 28 Mar 2024 00:50 )             40.8       03-28    136  |  101  |  17.9  ----------------------------<  102<H>  4.0   |  24.0  |  0.97    Ca    8.7      28 Mar 2024 00:50    TPro  6.9  /  Alb  3.6  /  TBili  0.3<L>  /  DBili  x   /  AST  22  /  ALT  13  /  AlkPhos  64  03-28      PT/INR - ( 28 Mar 2024 00:50 )   PT: 11.6 sec;   INR: 1.05 ratio         PTT - ( 28 Mar 2024 00:50 )  PTT:28.9 sec        A1C: PENDING  LDL: PENDING         RADIOLOGY & ADDITIONAL STUDIES (independently reviewed unless otherwise noted):    (03.28.24 @ 01:09)   CT PERFUSION: No core infarct or penumbra.  If symptoms persist consider follow up head CT or MRI, MRA  if no   contraindication.  CTA COW:  Patent intracranial circulation without flow limiting stenosis  CTA NECK: Patent, ECAs, ICAs, no  hemodynamically significant stenosis at    ICA origins by NASCET criteria.  Bilateral vertebral arteries are patent without flow limiting stenosis.    CT Brain Stroke Protocol (03.28.24 @ 01:02)   No evidence of acute intracranial hemorrhage, midline shift or CT   evidence of acute territorial infarct.                                    [facility] Stroke Team    CC: left sided weakness     HPI:  52 y/o male with unknown PMHx presented to ED as stroke code. As per ED documentation patient LKN 12AM 3/28/24 and developed acute onset L facial droop, L facial numbness, LUE/LLE weakness and numbness. Stroke code imaging negative, inital NIHSS 8, given tenecteplase at 1:12AM 3/28/24. At the time of examination NIHSS 15, patient had expressive aphasia with inability to talk.      PAST MEDICAL & SURGICAL HISTORY:  No pertinent past medical history  History of heart surgery    MEDICATIONS  (STANDING):  chlorhexidine 2% Cloths 1 Application(s) Topical <User Schedule>  dextrose 50% Injectable 25 Gram(s) IV Push once  dextrose 50% Injectable 25 Gram(s) IV Push once  dextrose 50% Injectable 12.5 Gram(s) IV Push once  insulin lispro (ADMELOG) corrective regimen sliding scale   SubCutaneous every 6 hours  sodium chloride 0.9%. 1000 milliLiter(s) (75 mL/Hr) IV Continuous <Continuous>    MEDICATIONS  (PRN):      Allergies  penicillin (Unknown)    Intolerances    SOCIAL HISTORY:  Pending Nictoine /etoh use?  drugs + cocaine     FAMILY HISTORY:  PENDING     ROS: 14 point ROS negative other than what is present in HPI or below    Vital Signs Last 24 Hrs  T(C): 36.5 (28 Mar 2024 08:03), Max: 36.8 (28 Mar 2024 03:48)  T(F): 97.7 (28 Mar 2024 08:03), Max: 98.2 (28 Mar 2024 03:48)  HR: 65 (28 Mar 2024 08:11) (63 - 105)  BP: 118/83 (28 Mar 2024 08:11) (106/81 - 129/89)  BP(mean): 93 (28 Mar 2024 08:11) (83 - 99)  RR: 11 (28 Mar 2024 08:11) (10 - 24)  SpO2: 100% (28 Mar 2024 08:11) (97% - 100%)    Parameters below as of 28 Mar 2024 07:41  Patient On (Oxygen Delivery Method): room air        Physical Exam:  General: No acute distress.     Detailed Neurologic Exam:    Mental status: The patient is awake and alert and has normal attention span.  The patient is fully oriented in 3 spheres. The patient is oriented to current events. The patient is able to name objects, follow commands, repeat sentences. (all by writing)    Cranial nerves: generates few short sounds initiating a word: possibly anarthria vs. apraxia, Pupils equal and react symmetrically to light. no facial palsy, There is no visual field deficit to confrontation. Extraocular motion is full with no nystagmus aside from a few beats on left gaze. There is no ptosis. Facial sensation is intact.      Motor: There is normal bulk and tone.  There is no tremor.  Strength is 5/5 in the right arm and leg.   Strength is 3/5 in left arm and leg with step wise drift to bed in <10 seconds in arm and < 5 seconds in leg.     Sensation: Intact to light touch and pin in 4 extremities    Cerebellar: There is no dysmetria on finger to nose testing.    Gait : deferred    [facility] SS:  DATE: 3/28/24  TIME: 0915  1A: Level of consciousness (0-3):   1B: Questions (0-2):   1C: Commands (0-2):   2: Gaze (0-2):   3: Visual fields (0-3):   4: Facial palsy (0-3): 1  MOTOR:  5A: Left arm motor drift (0-4): 2  5B: Right arm motor drift (0-4):   6A: Left leg motor drift (0-4): 2  6B: Right leg motor drift (0-4):   7: Limb ataxia (0-2):   SENSORY:  8: Sensation (0-2):   SPEECH:  9: Language (0-3): 0  10: Dysarthria (0-2): 2  EXTINCTION:  11: Extinction/inattention (0-2):     TOTAL SCORE: 7    prehospital mRS= un      LABS:                         13.4   11.92 )-----------( 246      ( 28 Mar 2024 00:50 )             40.8       03-28    136  |  101  |  17.9  ----------------------------<  102<H>  4.0   |  24.0  |  0.97    Ca    8.7      28 Mar 2024 00:50    TPro  6.9  /  Alb  3.6  /  TBili  0.3<L>  /  DBili  x   /  AST  22  /  ALT  13  /  AlkPhos  64  03-28      PT/INR - ( 28 Mar 2024 00:50 )   PT: 11.6 sec;   INR: 1.05 ratio         PTT - ( 28 Mar 2024 00:50 )  PTT:28.9 sec        A1C: PENDING  LDL: PENDING         RADIOLOGY & ADDITIONAL STUDIES (independently reviewed unless otherwise noted):    (03.28.24 @ 01:09)   CT PERFUSION: No core infarct or penumbra.  If symptoms persist consider follow up head CT or MRI, MRA  if no   contraindication.  CTA COW:  Patent intracranial circulation without flow limiting stenosis  CTA NECK: Patent, ECAs, ICAs, no  hemodynamically significant stenosis at    ICA origins by NASCET criteria.  Bilateral vertebral arteries are patent without flow limiting stenosis.    CT Brain Stroke Protocol (03.28.24 @ 01:02)   No evidence of acute intracranial hemorrhage, midline shift or CT   evidence of acute territorial infarct.

## 2024-03-28 NOTE — SPEECH LANGUAGE PATHOLOGY EVALUATION - SLP CONVERSATIONAL SPEECH
+word finding deficits noted however, pt observed to mask deficits by utilizing gestures, short phrases and self cueing throughout evaluation.

## 2024-03-28 NOTE — DISCHARGE NOTE NURSING/CASE MANAGEMENT/SOCIAL WORK - NSDCPEFALRISK_GEN_ALL_CORE
For information on Fall & Injury Prevention, visit: [email or web address] OR  [email or web address] OR  https://www.cdc.gov/steadi/patient.html

## 2024-03-28 NOTE — ED PROVIDER NOTE - PHYSICAL EXAMINATION
Gen: NAD, AOx3  Head: NCAT  HEENT: EOMI, oral mucosa moist, normal conjunctiva, neck supple  Lung: CTAB, no respiratory distress  CV: rrr, no murmur, Normal perfusion  Abd: soft, NTND  MSK: No edema, no visible deformities  Neuro: 1/5 motor response with decreased sensation in the left UE and LE, left facial side. 5/5 on the right side; intact sensation. No gaze palsy noted. Intact visual fields. Marked dysarthria.  Skin: No rash   Psych: normal affect

## 2024-03-28 NOTE — SPEECH LANGUAGE PATHOLOGY EVALUATION - SLP GENERAL OBSERVATIONS
Pt recd a&a, grossly Ox3, adequate speech intelligibility, low vocal volume, tolerating RA, NAD, 0/10 pain scale pre/post.

## 2024-03-28 NOTE — H&P ADULT - NSHPPHYSICALEXAM_GEN_ALL_CORE
Constitutional: 52 y/o male awake, alert in no acute distress.  Eyes:  Sclera anicteric, conjunctiva noninjected.  ENMT: Oropharyngeal mucosa moist, pink. Tongue midline.    Neck: Neck supple  Respiratory: normal chest rise, nonlabored breathing   Cardiovascular: Regular rate and rhythm.    Gastrointestinal:  Soft, nontender, nondistended.  Vascular: Extremities warm, no ulcers, no discoloration of skin.   Neurological: AA&O x 3 (appropriately gives thumbs up)  PERRL 3mm brisk b/l, severe expressive aphasia/mute, able to follow commands   RUE/RLE 5/5, LUE 1/5 distally, LLE 0/5  decr sensation on L   Skin: Warm, dry, no erythema.

## 2024-03-28 NOTE — ED ADULT TRIAGE NOTE - CHIEF COMPLAINT QUOTE
Patient presents to ED with c/o left sided numbness that started about 45 min PTA.  Patient unable to raise left arm and left leg associated with decreased sensation to left upper extremity.  Denies any pertinent medical history.  BS in triage 119

## 2024-03-28 NOTE — CONSULT NOTE ADULT - CRITICAL CARE ATTENDING COMMENT
53M prsented to ED with L hemiparesis and dysarthria, s/p tnk. Now with ?expressive aphasia in addition to L hemiparesis. Patient is communicating by writing with his right hand. CTH/CTA/CTP negative. utox positive for cocaine  Noted to have worsening overnight with NIHSS 8 -> 20. Currently 4-5.   Post tnk protocol, will await MRI  Stroke etiology under investigation at this time, cant rule out cardioembolic source and contribution from cocaine

## 2024-03-28 NOTE — SPEECH LANGUAGE PATHOLOGY EVALUATION - SPECIFY REASON(S)
assess primary receptive/expressive language, speech skills and higher level cognitive linguistic skills

## 2024-03-28 NOTE — ED ADULT TRIAGE NOTE - STATUS:
Quality 226: Preventive Care And Screening: Tobacco Use: Screening And Cessation Intervention: Patient screened for tobacco use and is an ex/non-smoker Detail Level: Detailed Quality 130: Documentation Of Current Medications In The Medical Record: Current Medications Documented Quality 431: Preventive Care And Screening: Unhealthy Alcohol Use - Screening: Patient not identified as an unhealthy alcohol user when screened for unhealthy alcohol use using a systematic screening method Applied

## 2024-03-28 NOTE — ED PROVIDER NOTE - NIH STROKE SCALE: 10. DYSARTHRIA, QM
An angiography was performed of the left coronary arteries. (1) Mild-to-moderate dysarthria; patient slurs at least some words and, at worst, can be understood with some difficulty

## 2024-03-28 NOTE — ED ADULT NURSE NOTE - STROKE SCALE - SEDATED
Her scans show evidence of persistent chronic sinus disease and evidence of her previous limited left sided FESS.  Please order the antibiotic,antifungal, and steroid sinus rinses from [facility] for her.  Let's try her for one month on these and she how she responds. If she does well we can transition her to steroid only daily rinses as maintenance.  If she does not respond well she could benefit from revision FESS.    
No

## 2024-03-28 NOTE — CONSULT NOTE ADULT - ASSESSMENT
ASSESSMENT: 54 y/o male with unknown PMHx presented to ED as stroke code. As per ED documentation patient last known well was 12AM 3/28/24 and developed acute onset Left  facial palsy , Left  facial numbness, LUE/LLE hemiparesis and paresthesias.  CT head without acute hemorrhage or infarction,  inital NIHSS 8, given tenecteplase at 1:12AM 3/28/24.  CT angiogram without significant flow limiting stenosis or large vessel occlusion there for not thrombectomy candidate. Concern for right hemispheric dysfunction. Drug screen revealed cocaine metabolite possibly contributory.     NEURO:   -Neurologically mute but without evidence of gross aphasia otherwise, he has left hemiparesis of face arm and leg.     -Continue close monitoring for neurologic deterioration    - Stroke neuro checks per post tenecteplase protocol.   - Permissive HTN w/ SBP goal < 180/105mmHg per protocol    -ANTITHROMBOTIC THERAPY: on hold at this time given post thrombectomy protocol, pending findings of follow up post 24 hour protocol CT neuro imaging/ MR brain will determine/anticipate initiation of ASA 81mg daily for now.   -titrate statin to LDL goal less than 70  -MRI Brain w/o pending   -Dysphagia screen: fail- SLP follow up for further advisement, NPO in interim.    -Physical therapy/OT/Speech eval/treatment.     -CARDIOVASCULAR: check TTE, cardiac monitoring w/ telemetry for now, further evaluation pending findings of noted workup                              -HEMATOLOGY: H/H without anemia, Platelets 246, patient should have all age and risk appropriate malignancy screenings with PCP or sooner if clinically suspected   -LE duplex pending      DVT ppx: SCD for now, pharmacological ppx pending post 24 hour post tenecteplase protocol follow up neuro-imaging.     PULMONARY:  protecting airway, saturating well , check CXR     RENAL: BUN/Cr within range, monitor urine output, maintain adequate hydration       Na Goal:  135-145    ID: afebrile,  leukocytosis, monitor for si/sx of infection     OTHER:  condition and plan of care d/w patient, ICU, questions and concerns addressed.     DISPOSITION: Rehab or home depending on PT eval once stable and workup is complete      CORE MEASURES:        Admission NIHSS: 8     Tenecteplase : [x] YES [] NO      LDL/HDL/A1C: pending      Depression Screen- if depression hx and/or present      Statin Therapy: as noted      Dysphagia Screen: [] PASS [x] FAIL     Smoking [] YES [] NO pending      Afib [] YES [x] NO     Stroke Education [] YES [] NO pending     Obtain screening lower extremity venous ultrasound in patients who meet 1 or more of the following criteria as patient is high risk for DVT/PE on admission:   [] History of DVT/PE  []Hypercoagulable states (Factor V Leiden, Cancer, OCP, etc. )  []Prolonged immobility (hemiplegia/hemiparesis/post operative or any other extended immobilization)  [] Transferred from outside facility (Rehab or Long term care)  [] Age </= to 50 ASSESSMENT: 52 y/o male with unknown PMHx presented to ED as stroke code. As per ED documentation patient last known well was 12AM 3/28/24 and developed acute onset Left  facial palsy , Left  facial numbness, LUE/LLE hemiparesis and paresthesias.  CT head without acute hemorrhage or infarction,  inital NIHSS 8, given tenecteplase at 1:12AM 3/28/24.  CT angiogram without significant flow limiting stenosis or large vessel occlusion there for not thrombectomy candidate. Concern for right hemispheric dysfunction. Drug screen revealed cocaine metabolite possibly contributory. Etioloy unknown at this time, possibly cardioembolic. Will need further workup.     NEURO:   -Neurologically mute but without evidence of gross aphasia otherwise, he has left hemiparesis of face arm and leg.     -Continue close monitoring for neurologic deterioration    - Stroke neuro checks per post tenecteplase protocol.   - Permissive HTN w/ SBP goal < 180/105mmHg per protocol    -ANTITHROMBOTIC THERAPY: on hold at this time given post thrombectomy protocol, pending findings of follow up post 24 hour protocol CT neuro imaging/ MR brain will determine/anticipate initiation of ASA 81mg daily for now.   -titrate statin to LDL goal less than 70  -MRI Brain w/o pending   -Dysphagia screen: fail- SLP follow up for further advisement, NPO in interim.    -Physical therapy/OT/Speech eval/treatment.     -CARDIOVASCULAR: check TTE, cardiac monitoring w/ telemetry for now, further evaluation pending findings of noted workup                              -HEMATOLOGY: H/H without anemia, Platelets 246, patient should have all age and risk appropriate malignancy screenings with PCP or sooner if clinically suspected   -LE duplex pending      DVT ppx: SCD for now, pharmacological ppx pending post 24 hour post tenecteplase protocol follow up neuro-imaging.     PULMONARY:  protecting airway, saturating well , check CXR     RENAL: BUN/Cr within range, monitor urine output, maintain adequate hydration       Na Goal:  135-145    ID: afebrile,  leukocytosis, monitor for si/sx of infection     OTHER:  condition and plan of care d/w patient, ICU, questions and concerns addressed.     DISPOSITION: Rehab or home depending on PT eval once stable and workup is complete      CORE MEASURES:        Admission NIHSS: 8     Tenecteplase : [x] YES [] NO      LDL/HDL/A1C: pending      Depression Screen- if depression hx and/or present      Statin Therapy: as noted      Dysphagia Screen: [] PASS [x] FAIL     Smoking [] YES [] NO pending      Afib [] YES [x] NO     Stroke Education [] YES [] NO pending     Obtain screening lower extremity venous ultrasound in patients who meet 1 or more of the following criteria as patient is high risk for DVT/PE on admission:   [] History of DVT/PE  []Hypercoagulable states (Factor V Leiden, Cancer, OCP, etc. )  []Prolonged immobility (hemiplegia/hemiparesis/post operative or any other extended immobilization)  [] Transferred from outside facility (Rehab or Long term care)  [] Age </= to 50

## 2024-03-28 NOTE — PATIENT PROFILE ADULT - FALL HARM RISK - HARM RISK INTERVENTIONS

## 2024-03-29 ENCOUNTER — TRANSCRIPTION ENCOUNTER (OUTPATIENT)
Age: 54
End: 2024-03-29

## 2024-03-29 VITALS
HEART RATE: 72 BPM | OXYGEN SATURATION: 100 % | RESPIRATION RATE: 16 BRPM | SYSTOLIC BLOOD PRESSURE: 118 MMHG | DIASTOLIC BLOOD PRESSURE: 84 MMHG

## 2024-03-29 LAB
A1C WITH ESTIMATED AVERAGE GLUCOSE RESULT: 5.6 % — SIGNIFICANT CHANGE UP (ref 4–5.6)
ANION GAP SERPL CALC-SCNC: 10 MMOL/L — SIGNIFICANT CHANGE UP (ref 5–17)
BUN SERPL-MCNC: 23.1 MG/DL — HIGH (ref 8–20)
CALCIUM SERPL-MCNC: 8.2 MG/DL — LOW (ref 8.4–10.5)
CHLORIDE SERPL-SCNC: 104 MMOL/L — SIGNIFICANT CHANGE UP (ref 96–108)
CHOLEST SERPL-MCNC: 134 MG/DL — SIGNIFICANT CHANGE UP
CO2 SERPL-SCNC: 25 MMOL/L — SIGNIFICANT CHANGE UP (ref 22–29)
CREAT SERPL-MCNC: 1.05 MG/DL — SIGNIFICANT CHANGE UP (ref 0.5–1.3)
EGFR: 85 ML/MIN/1.73M2 — SIGNIFICANT CHANGE UP
ESTIMATED AVERAGE GLUCOSE: 114 MG/DL — SIGNIFICANT CHANGE UP (ref 68–114)
GLUCOSE BLDC GLUCOMTR-MCNC: 102 MG/DL — HIGH (ref 70–99)
GLUCOSE BLDC GLUCOMTR-MCNC: 145 MG/DL — HIGH (ref 70–99)
GLUCOSE SERPL-MCNC: 109 MG/DL — HIGH (ref 70–99)
HCT VFR BLD CALC: 38.8 % — LOW (ref 39–50)
HDLC SERPL-MCNC: 71 MG/DL — SIGNIFICANT CHANGE UP
HGB BLD-MCNC: 12.6 G/DL — LOW (ref 13–17)
LIPID PNL WITH DIRECT LDL SERPL: 54 MG/DL — SIGNIFICANT CHANGE UP
MAGNESIUM SERPL-MCNC: 2.1 MG/DL — SIGNIFICANT CHANGE UP (ref 1.6–2.6)
MCHC RBC-ENTMCNC: 28 PG — SIGNIFICANT CHANGE UP (ref 27–34)
MCHC RBC-ENTMCNC: 32.5 GM/DL — SIGNIFICANT CHANGE UP (ref 32–36)
MCV RBC AUTO: 86.2 FL — SIGNIFICANT CHANGE UP (ref 80–100)
NON HDL CHOLESTEROL: 63 MG/DL — SIGNIFICANT CHANGE UP
PHOSPHATE SERPL-MCNC: 3.8 MG/DL — SIGNIFICANT CHANGE UP (ref 2.4–4.7)
PLATELET # BLD AUTO: 208 K/UL — SIGNIFICANT CHANGE UP (ref 150–400)
POTASSIUM SERPL-MCNC: 4.3 MMOL/L — SIGNIFICANT CHANGE UP (ref 3.5–5.3)
POTASSIUM SERPL-SCNC: 4.3 MMOL/L — SIGNIFICANT CHANGE UP (ref 3.5–5.3)
RBC # BLD: 4.5 M/UL — SIGNIFICANT CHANGE UP (ref 4.2–5.8)
RBC # FLD: 13.8 % — SIGNIFICANT CHANGE UP (ref 10.3–14.5)
SODIUM SERPL-SCNC: 139 MMOL/L — SIGNIFICANT CHANGE UP (ref 135–145)
TRIGL SERPL-MCNC: 43 MG/DL — SIGNIFICANT CHANGE UP
TSH SERPL-MCNC: 1.24 UIU/ML — SIGNIFICANT CHANGE UP (ref 0.27–4.2)
WBC # BLD: 7.23 K/UL — SIGNIFICANT CHANGE UP (ref 3.8–10.5)
WBC # FLD AUTO: 7.23 K/UL — SIGNIFICANT CHANGE UP (ref 3.8–10.5)

## 2024-03-29 PROCEDURE — 70551 MRI BRAIN STEM W/O DYE: CPT | Mod: MC

## 2024-03-29 PROCEDURE — 83735 ASSAY OF MAGNESIUM: CPT

## 2024-03-29 PROCEDURE — 72141 MRI NECK SPINE W/O DYE: CPT | Mod: MC

## 2024-03-29 PROCEDURE — 85610 PROTHROMBIN TIME: CPT

## 2024-03-29 PROCEDURE — 80061 LIPID PANEL: CPT

## 2024-03-29 PROCEDURE — 72141 MRI NECK SPINE W/O DYE: CPT | Mod: 26

## 2024-03-29 PROCEDURE — 85730 THROMBOPLASTIN TIME PARTIAL: CPT

## 2024-03-29 PROCEDURE — 84484 ASSAY OF TROPONIN QUANT: CPT

## 2024-03-29 PROCEDURE — 84443 ASSAY THYROID STIM HORMONE: CPT

## 2024-03-29 PROCEDURE — 80307 DRUG TEST PRSMV CHEM ANLYZR: CPT

## 2024-03-29 PROCEDURE — 99233 SBSQ HOSP IP/OBS HIGH 50: CPT

## 2024-03-29 PROCEDURE — 36415 COLL VENOUS BLD VENIPUNCTURE: CPT

## 2024-03-29 PROCEDURE — 93005 ELECTROCARDIOGRAM TRACING: CPT

## 2024-03-29 PROCEDURE — 96374 THER/PROPH/DIAG INJ IV PUSH: CPT

## 2024-03-29 PROCEDURE — 70450 CT HEAD/BRAIN W/O DYE: CPT | Mod: MC

## 2024-03-29 PROCEDURE — 84100 ASSAY OF PHOSPHORUS: CPT

## 2024-03-29 PROCEDURE — 85025 COMPLETE CBC W/AUTO DIFF WBC: CPT

## 2024-03-29 PROCEDURE — 99291 CRITICAL CARE FIRST HOUR: CPT | Mod: 25

## 2024-03-29 PROCEDURE — 93306 TTE W/DOPPLER COMPLETE: CPT

## 2024-03-29 PROCEDURE — 85027 COMPLETE CBC AUTOMATED: CPT

## 2024-03-29 PROCEDURE — 0042T: CPT | Mod: MC

## 2024-03-29 PROCEDURE — 87641 MR-STAPH DNA AMP PROBE: CPT

## 2024-03-29 PROCEDURE — 80053 COMPREHEN METABOLIC PANEL: CPT

## 2024-03-29 PROCEDURE — 83036 HEMOGLOBIN GLYCOSYLATED A1C: CPT

## 2024-03-29 PROCEDURE — 70551 MRI BRAIN STEM W/O DYE: CPT | Mod: 26

## 2024-03-29 PROCEDURE — 97167 OT EVAL HIGH COMPLEX 60 MIN: CPT

## 2024-03-29 PROCEDURE — 80048 BASIC METABOLIC PNL TOTAL CA: CPT

## 2024-03-29 PROCEDURE — 70498 CT ANGIOGRAPHY NECK: CPT | Mod: MC

## 2024-03-29 PROCEDURE — 70496 CT ANGIOGRAPHY HEAD: CPT | Mod: MC

## 2024-03-29 PROCEDURE — 82962 GLUCOSE BLOOD TEST: CPT

## 2024-03-29 PROCEDURE — 87640 STAPH A DNA AMP PROBE: CPT

## 2024-03-29 PROCEDURE — 70450 CT HEAD/BRAIN W/O DYE: CPT | Mod: 26

## 2024-03-29 PROCEDURE — 96105 ASSESSMENT OF APHASIA: CPT

## 2024-03-29 RX ORDER — TRAZODONE HCL 50 MG
0 TABLET ORAL
Refills: 0 | DISCHARGE

## 2024-03-29 RX ORDER — ATORVASTATIN CALCIUM 80 MG/1
1 TABLET, FILM COATED ORAL
Refills: 0 | DISCHARGE

## 2024-03-29 RX ORDER — SERTRALINE 25 MG/1
0 TABLET, FILM COATED ORAL
Refills: 0 | DISCHARGE

## 2024-03-29 RX ORDER — OMEPRAZOLE 10 MG/1
0 CAPSULE, DELAYED RELEASE ORAL
Refills: 0 | DISCHARGE

## 2024-03-29 RX ORDER — QUETIAPINE FUMARATE 200 MG/1
0 TABLET, FILM COATED ORAL
Refills: 0 | DISCHARGE

## 2024-03-29 RX ORDER — ASPIRIN/CALCIUM CARB/MAGNESIUM 324 MG
1 TABLET ORAL
Refills: 0 | DISCHARGE

## 2024-03-29 RX ADMIN — CHLORHEXIDINE GLUCONATE 1 APPLICATION(S): 213 SOLUTION TOPICAL at 05:02

## 2024-03-29 NOTE — PHYSICAL THERAPY INITIAL EVALUATION ADULT - GENERAL OBSERVATIONS, REHAB EVAL
Pt received in bed, + IV Loc, +Tele//BP monitoring + VCBs, breathing on RA in NAD, in 0/10 pain, agreeable to PT evaluation

## 2024-03-29 NOTE — PROGRESS NOTE ADULT - SUBJECTIVE AND OBJECTIVE BOX
Preliminary note, official recommendations pending attending review/signature   Seen and examined by Stroke team attending/team, assessment/ plan as discussed with stroke team attending/team as noted.     [facility] Stroke Team  Progress Note     HPI:  54 y/o male with unknown PMHx presented to ED as stroke code. As per ED documentation patient LKN 12AM 3/28/24 and developed acute onset L facial droop, L facial numbness, LUE/LLE weakness and numbness. Stroke code imaging negative, inital NIHSS 8, given tenecteplase at 1:12AM 3/28/24. At the time of examination NIHSS 15, patient had expressive aphasia with inability to talk.      SUBJECTIVE: No events overnight.  No new neurologic complaints.  ROS reported negative unless otherwise noted.    chlorhexidine 2% Cloths 1 Application(s) Topical <User Schedule>  dextrose 50% Injectable 25 Gram(s) IV Push once  dextrose 50% Injectable 12.5 Gram(s) IV Push once  dextrose 50% Injectable 25 Gram(s) IV Push once  insulin lispro (ADMELOG) corrective regimen sliding scale   SubCutaneous every 6 hours      PHYSICAL EXAM:   Vital Signs Last 24 Hrs  T(C): 36.9 (29 Mar 2024 12:10), Max: 36.9 (29 Mar 2024 12:10)  T(F): 98.4 (29 Mar 2024 12:10), Max: 98.4 (29 Mar 2024 12:10)  HR: 72 (29 Mar 2024 13:00) (60 - 95)  BP: 118/84 (29 Mar 2024 13:00) (106/58 - 129/77)  BP(mean): 94 (29 Mar 2024 13:00) (70 - 97)  RR: 16 (29 Mar 2024 13:00) (10 - 23)  SpO2: 100% (29 Mar 2024 13:00) (95% - 100%)    Parameters below as of 29 Mar 2024 08:00  Patient On (Oxygen Delivery Method): room air        Physical Exam:  General: No acute distress.     Detailed Neurologic Exam:    Mental status: The patient is awake and alert and has normal attention span.  The patient is fully oriented in 3 spheres. The patient is oriented to current events. The patient is able to name objects, follow commands, repeat sentences. Speech fluent.     Cranial nerves: Pupils equal and react symmetrically to light. no facial palsy, There is no visual field deficit to confrontation. Extraocular motion is full with no nystagmus  There is no ptosis. Facial sensation is intact.      Motor: There is normal bulk and tone.  There is no tremor.  Strength is 5/5 in the right arm and leg.   Strength is 5/5 in the left arm and leg      Sensation: Intact to light touch and pin in 4 extremities    Cerebellar: There is no gross dysmetria     Gait : deferred      LABS:                        12.6   7.23  )-----------( 208      ( 29 Mar 2024 03:10 )             38.8    03-29    139  |  104  |  23.1<H>  ----------------------------<  109<H>  4.3   |  25.0  |  1.05    Ca    8.2<L>      29 Mar 2024 03:10  Phos  3.8     03-29  Mg     2.1     03-29    TPro  6.9  /  Alb  3.6  /  TBili  0.3<L>  /  DBili  x   /  AST  22  /  ALT  13  /  AlkPhos  64  03-28  PT/INR - ( 28 Mar 2024 00:50 )   PT: 11.6 sec;   INR: 1.05 ratio         PTT - ( 28 Mar 2024 00:50 )  PTT:28.9 sec      03-29 Chol 134 LDL -63- HDL 71 Trig 43    A1C: 5.6    RADIOLOGY & ADDITIONAL STUDIES (independently reviewed unless otherwise noted):    MR Head No Cont (03.29.24 @ 01:14)   No evidence for intracranial mass, acute territorial infarct, acute   intracranial hemorrhage, or midline shift.    MR Cervical Spine No Cont (03.29.24 @ 01:36)   IMPRESSION:  Straightening of which may reflect pain or muscle spasm.     Mild disc degeneration and spondylosis at C5-6 with unchanged LEFT   paracentral disc herniation with interval development of a supporting   osteophytic ridge which compresses the LEFT aspect of the ventral cord   and narrows the LEFT neural foramen.    (03.28.24 @ 01:09)   CT PERFUSION: No core infarct or penumbra.  If symptoms persist consider follow up head CT or MRI, MRA  if no   contraindication.  CTA COW:  Patent intracranial circulation without flow limiting stenosis  CTA NECK: Patent, ECAs, ICAs, no  hemodynamically significant stenosis at    ICA origins by NASCET criteria.  Bilateral vertebral arteries are patent without flow limiting stenosis.    CT Brain Stroke Protocol (03.28.24 @ 01:02)   No evidence of acute intracranial hemorrhage, midline shift or CT   evidence of acute territorial infarct.           [facility] Stroke Team  Progress Note     HPI:  52 y/o male with unknown PMHx presented to ED as stroke code. As per ED documentation patient LKN 12AM 3/28/24 and developed acute onset L facial droop, L facial numbness, LUE/LLE weakness and numbness. Stroke code imaging negative, inital NIHSS 8, given tenecteplase at 1:12AM 3/28/24. At the time of examination NIHSS 15, patient had expressive aphasia with inability to talk.      SUBJECTIVE: No events overnight.  No new neurologic complaints.  ROS reported negative unless otherwise noted.    chlorhexidine 2% Cloths 1 Application(s) Topical <User Schedule>  dextrose 50% Injectable 25 Gram(s) IV Push once  dextrose 50% Injectable 12.5 Gram(s) IV Push once  dextrose 50% Injectable 25 Gram(s) IV Push once  insulin lispro (ADMELOG) corrective regimen sliding scale   SubCutaneous every 6 hours      PHYSICAL EXAM:   Vital Signs Last 24 Hrs  T(C): 36.9 (29 Mar 2024 12:10), Max: 36.9 (29 Mar 2024 12:10)  T(F): 98.4 (29 Mar 2024 12:10), Max: 98.4 (29 Mar 2024 12:10)  HR: 72 (29 Mar 2024 13:00) (60 - 95)  BP: 118/84 (29 Mar 2024 13:00) (106/58 - 129/77)  BP(mean): 94 (29 Mar 2024 13:00) (70 - 97)  RR: 16 (29 Mar 2024 13:00) (10 - 23)  SpO2: 100% (29 Mar 2024 13:00) (95% - 100%)    Parameters below as of 29 Mar 2024 08:00  Patient On (Oxygen Delivery Method): room air        Physical Exam:  General: No acute distress.     Detailed Neurologic Exam:    Mental status: The patient is awake and alert and has normal attention span.  The patient is fully oriented in 3 spheres. The patient is oriented to current events. The patient is able to name objects, follow commands, repeat sentences. Speech fluent.     Cranial nerves: Pupils equal and react symmetrically to light. no facial palsy, There is no visual field deficit to confrontation. Extraocular motion is full with no nystagmus  There is no ptosis. Facial sensation is intact.      Motor: There is normal bulk and tone.  There is no tremor.  Strength is 5/5 in the right arm and leg.   Strength is 5/5 in the left arm and leg      Sensation: Intact to light touch and pin in 4 extremities    Cerebellar: There is no gross dysmetria     Gait : deferred      LABS:                        12.6   7.23  )-----------( 208      ( 29 Mar 2024 03:10 )             38.8    03-29    139  |  104  |  23.1<H>  ----------------------------<  109<H>  4.3   |  25.0  |  1.05    Ca    8.2<L>      29 Mar 2024 03:10  Phos  3.8     03-29  Mg     2.1     03-29    TPro  6.9  /  Alb  3.6  /  TBili  0.3<L>  /  DBili  x   /  AST  22  /  ALT  13  /  AlkPhos  64  03-28  PT/INR - ( 28 Mar 2024 00:50 )   PT: 11.6 sec;   INR: 1.05 ratio         PTT - ( 28 Mar 2024 00:50 )  PTT:28.9 sec      03-29 Chol 134 LDL -63- HDL 71 Trig 43    A1C: 5.6    RADIOLOGY & ADDITIONAL STUDIES (independently reviewed unless otherwise noted):    MR Head No Cont (03.29.24 @ 01:14)   No evidence for intracranial mass, acute territorial infarct, acute   intracranial hemorrhage, or midline shift.    MR Cervical Spine No Cont (03.29.24 @ 01:36)   IMPRESSION:  Straightening of which may reflect pain or muscle spasm.     Mild disc degeneration and spondylosis at C5-6 with unchanged LEFT   paracentral disc herniation with interval development of a supporting   osteophytic ridge which compresses the LEFT aspect of the ventral cord   and narrows the LEFT neural foramen.    (03.28.24 @ 01:09)   CT PERFUSION: No core infarct or penumbra.  If symptoms persist consider follow up head CT or MRI, MRA  if no   contraindication.  CTA COW:  Patent intracranial circulation without flow limiting stenosis  CTA NECK: Patent, ECAs, ICAs, no  hemodynamically significant stenosis at    ICA origins by NASCET criteria.  Bilateral vertebral arteries are patent without flow limiting stenosis.    CT Brain Stroke Protocol (03.28.24 @ 01:02)   No evidence of acute intracranial hemorrhage, midline shift or CT   evidence of acute territorial infarct.

## 2024-03-29 NOTE — PHYSICAL THERAPY INITIAL EVALUATION ADULT - PERTINENT HX OF CURRENT PROBLEM, REHAB EVAL
54 y/o male with unknown PMHx presented to ED as stroke code. As per ED documentation patient LKN 12AM and developed acute onset L facial droop, L facial numbness, LUE/LLE weakness and numbness. Stroke code imaging negative, inital NIHSS 8, given tenecteplase at 1:12AM. At the time of examination NIHSS 15, patient had expressive aphasia with inability to talk.

## 2024-03-29 NOTE — PROGRESS NOTE ADULT - SUBJECTIVE AND OBJECTIVE BOX
Patient feels well.  MRI shows no acute findings for CVA.     FUNCTIONAL PROGRESS  3/28 SLP  Clinical Impression and Recommendations:   · Diagnosis	Primary receptive language skills assessed to be WFL. Mild-moderate expressive language deficits characterized by word finding deficits noted in conversational discourse. Pt reports being aware of difficulties and utilizes short phrases for conversation.  Pt w/ fair ability to complete structured tasks however, increased time required. Low vocal volume noted, ?poor breath support. No dysarthria observed, although pt states he feels his speech is slurred. Higher level cognitive linguistic skills informally assessed to be WFL, although formal screening/testing required.      VITALS  T(C): 36.7 (03-29-24 @ 08:02), Max: 36.8 (03-28-24 @ 12:02)  HR: 67 (03-29-24 @ 06:00) (59 - 95)  BP: 109/72 (03-29-24 @ 06:00) (106/58 - 128/84)  RR: 11 (03-29-24 @ 06:00) (11 - 20)  SpO2: 97% (03-29-24 @ 06:00) (95% - 100%)  Wt(kg): --    MEDICATIONS   chlorhexidine 2% Cloths 1 Application(s) <User Schedule>  dextrose 50% Injectable 25 Gram(s) once  dextrose 50% Injectable 25 Gram(s) once  dextrose 50% Injectable 12.5 Gram(s) once  insulin lispro (ADMELOG) corrective regimen sliding scale   every 6 hours      RECENT LABS/IMAGING  - Reviewed Today                        12.6   7.23  )-----------( 208      ( 29 Mar 2024 03:10 )             38.8     03-29    139  |  104  |  23.1<H>  ----------------------------<  109<H>  4.3   |  25.0  |  1.05    Ca    8.2<L>      29 Mar 2024 03:10  Phos  3.8     03-29  Mg     2.1     03-29    TPro  6.9  /  Alb  3.6  /  TBili  0.3<L>  /  DBili  x   /  AST  22  /  ALT  13  /  AlkPhos  64  03-28    PT/INR - ( 28 Mar 2024 00:50 )   PT: 11.6 sec;   INR: 1.05 ratio         PTT - ( 28 Mar 2024 00:50 )  PTT:28.9 sec  Urinalysis Basic - ( 29 Mar 2024 03:10 )    Color: x / Appearance: x / SG: x / pH: x  Gluc: 109 mg/dL / Ketone: x  / Bili: x / Urobili: x   Blood: x / Protein: x / Nitrite: x   Leuk Esterase: x / RBC: x / WBC x   Sq Epi: x / Non Sq Epi: x / Bacteria: x            CT PERFUSION: No core infarct or penumbra. If symptoms persist consider follow up head CT or MRI, MRA  if no   contraindication.    CTA COW:  Patent intracranial circulation without flow limiting stenosis    CTA NECK: Patent, ECAs, ICAs, no  hemodynamically significant stenosis at  ICA origins by NASCET criteria. Bilateral vertebral arteries are patent without flow limiting stenosis.    MRI HEAD - No evidence for intracranial mass, acute territorial infarct, acute intracranial hemorrhage, or midline shift.  ----------------------------------------------------------------------------------------  PHYSICAL EXAM  Constitutional - NAD, Comfortable   Extremities - No C/C/E, No calf tenderness   Neurologic Exam -                    Cognitive - AAOx4     Communication - Fluent     Cranial Nerves - No facial droop  Psychiatric - Mood stable, Affect WNL  ----------------------------------------------------------------------------------------  ASSESSMENT/PLAN  53yMale with functional deficits after developing left sided weakness with aphasia?  DVT PPX - SCDs  Rehab/Impaired mobility and function - Patient continues to require hospitalization for the above diagnoses and ongoing active management of comorbid complications (ICU level care) that are substantially impairing functional ability and impairing quality of life necessitating ongoing medical management of these complications.     Based on the patient's MRI and inconsistent exam with correlated neuroanatomy, expect patient to achieve DC HOME. If patient is unable to achieve discharge home, recommend [facility]. Patient does NOT meet criteria for acute rehab.     Will sign off at this time. Thank you for allowing me to be part of your patient's care. Please reconsult PMR for additional rehab recommendations or dispo needs if functional status changes. Discussed the specific management and recommendations above with rehab clinical care team/rehab liaison.

## 2024-03-29 NOTE — OCCUPATIONAL THERAPY INITIAL EVALUATION ADULT - PERTINENT HX OF CURRENT PROBLEM, REHAB EVAL
As per MD note: 52 y/o male with unknown PMHx presented to ED as stroke code. As per ED documentation patient LKN 12AM and developed acute onset L facial droop, L facial numbness, LUE/LLE weakness and numbness. Stroke code imaging negative, inital NIHSS 8, given tenecteplase at 1:12AM. At the time of examination NIHSS 15, patient had expressive aphasia with inability to talk.

## 2024-03-29 NOTE — PROGRESS NOTE ADULT - ASSESSMENT
ASSESSMENT:   52 y/o male with unknown PMHx presented to ED as stroke code. As per ED documentation patient LKN 12AM and developed acute onset L facial droop, L facial numbness, LUE/LLE weakness and numbness. Stroke code imaging negative, inital NIHSS 8, given tenecteplase at 1:12AM. At the time of examination NIHSS 15, patient had expressive aphasia with inability to talk.      PLAN:   Neuro:  - Q2 hour Neuro checks, Q1 hour Vitals  - HOB 30 degrees, Neck midline position  - Maintain normothermia, PO acetaminophen for temp>38 C or pain  - stroke core measures   - repeat CTH 3/29 1:12AM  - pending MRI brain and c-spine without contrast   	  CV:  - SBP Goal 110-180     Pulm:  - Supplemental O2 PRN to maintain Spo2>92%    GI:  - adv diet as tolerated  	  Gu:  - Monitor Electrolytes & Renal Function  - NS@75   - voiding    Heme:  - Monitor H&H  - DVT: SCDs  - pending b/l LE duplex   	  ID:  - Monitor WBC and Temperature	    Endo  - Monitor BGL, maintain <180  - ISS       ASSESSMENT:   54 y/o male with unknown PMHx presented to ED as stroke code. As per ED documentation patient LKN 12AM and developed acute onset L facial droop, L facial numbness, LUE/LLE weakness and numbness. Stroke code imaging negative, inital NIHSS 8, given tenecteplase at 1:12AM. At the time of examination NIHSS 15, patient had expressive aphasia with inability to talk.      PLAN:   Neuro:  - Q4 hour Neuro checks, Q1 hour Vitals  - HOB 30 degrees, Neck midline position  - Maintain normothermia, PO acetaminophen for temp>38 C or pain  - stroke core measures   - MRI brain and c-spine without contrast- unremarkable  	  CV:  - SBP Goal 110-180     Pulm:  - Supplemental O2 PRN to maintain Spo2>92%    GI:  - adv diet as tolerated  	  Gu:  - Monitor Electrolytes & Renal Function  - voiding    Heme:  - Monitor H&H  - DVT: SCDs  	  ID:  - Monitor WBC and Temperature	    Endo  - Monitor BGL, maintain <180  - ISS

## 2024-03-29 NOTE — DISCHARGE NOTE PROVIDER - NSDCMRMEDTOKEN_GEN_ALL_CORE_FT
allergy eye drops:   aspirin 81 mg oral delayed release tablet: 1 tab(s) orally once a day  atorvastatin 40 mg oral tablet: 1 tab(s) orally once a day  omeprazole: once daily  Physical Therapy: physical therapy as needed outpatient  seroquel: once a day  traZODone 50 mg oral tablet: orally once a day  zoloft: daily

## 2024-03-29 NOTE — DISCHARGE NOTE PROVIDER - NSDCCPCAREPLAN_GEN_ALL_CORE_FT
PRINCIPAL DISCHARGE DIAGNOSIS  Diagnosis: Transient ischemic attack (TIA)  Assessment and Plan of Treatment:       SECONDARY DISCHARGE DIAGNOSES  Diagnosis: Cocaine use disorder  Assessment and Plan of Treatment:

## 2024-03-29 NOTE — PROGRESS NOTE ADULT - ASSESSMENT
ASSESSMENT: 52 y/o male with unknown PMHx presented to ED as stroke code. As per ED documentation patient last known well was 12AM 3/28/24 and developed acute onset Left  facial palsy , Left  facial numbness, LUE/LLE hemiparesis and paresthesias.  CT head without acute hemorrhage or infarction,  inital NIHSS 8, given tenecteplase at 1:12AM 3/28/24.  CT angiogram without significant flow limiting stenosis or large vessel occlusion there for not thrombectomy candidate. Concern for right hemispheric dysfunction unclear etiology, Drug screen revealed cocaine metabolite possibly contributory , no noted cerebral infarction on MRI.        NEURO:   -Neurologically appears back to baseline.    - Stroke neuro checks q 4 hr   - gradual age and risk specific normotension as tolerated    -ANTITHROMBOTIC THERAPY: on ASA 81mg daily per ICU, GI PPX   -titrate statin to LDL goal less than 70  -MRI Brain w/o as noted   -Dysphagia screen: fail- SLP follow up for further advisement, NPO in interim.    -Physical therapy/OT/Speech eval/treatment.     -CARDIOVASCULAR:  TTE as noted , cardiac monitoring w/ telemetry for now, further evaluation pending findings of noted workup, elective cardiac monitoring                               -HEMATOLOGY: H/H without anemia, Platelets 208, patient should have all age and risk appropriate malignancy screenings with PCP or sooner if clinically suspected   -LE duplex pending      DVT ppx:  LMWH    PULMONARY:  protecting airway, saturating well , check CXR     RENAL: BUN elevated /Cr within range, monitor urine output, maintain adequate hydration       Na Goal:  135-145    ID: afebrile,  leukocytosis resolved - possibly acutely reactive in setting of cocaine use , monitor for si/sx of infection     OTHER:  condition and plan of care d/w patient, ICU, questions and concerns addressed.     DISPOSITION: Rehab or home depending on PT eval once stable and workup is complete. Social work follow up for substance use disorder.      CORE MEASURES:        Admission NIHSS: 8     Tenecteplase : [x] YES [] NO      LDL/HDL/A1C:  54/71/5.6     Depression Screen- if depression hx and/or present      Statin Therapy: as noted      Dysphagia Screen: [] PASS [x] FAIL     Smoking [] YES [] NO pending      Afib [] YES [x] NO     Stroke Education [] YES [] NO 3/28/24    Obtain screening lower extremity venous ultrasound in patients who meet 1 or more of the following criteria as patient is high risk for DVT/PE on admission:   [] History of DVT/PE  []Hypercoagulable states (Factor V Leiden, Cancer, OCP, etc. )  []Prolonged immobility (hemiplegia/hemiparesis/post operative or any other extended immobilization)  [] Transferred from outside facility (Rehab or Long term care)  [] Age </= to 50 ASSESSMENT: 52 y/o male with unknown PMHx presented to ED as stroke code. As per ED documentation patient last known well was 12AM 3/28/24 and developed acute onset Left  facial palsy , Left  facial numbness, LUE/LLE hemiparesis and paresthesias.  CT head without acute hemorrhage or infarction,  inital NIHSS 8, given tenecteplase at 1:12AM 3/28/24.  CT angiogram without significant flow limiting stenosis or large vessel occlusion there for not thrombectomy candidate. Concern for right hemispheric dysfunction unclear etiology, Drug screen revealed cocaine metabolite possibly contributory , no noted cerebral infarction on MRI.        NEURO:   -Neurologically appears back to baseline.    - Stroke neuro checks q 4 hr   - gradual age and risk specific normotension as tolerated    -ANTITHROMBOTIC THERAPY: on ASA 81mg daily per ICU, GI PPX   -titrate statin to LDL goal less than 70  -MRI Brain w/o as noted   -nsx follow up for incidental spinal findings as noted above   -Dysphagia screen: fail- SLP follow up for further advisement, NPO in interim.    -Physical therapy/OT/Speech eval/treatment.     -CARDIOVASCULAR:  TTE as noted , cardiac monitoring w/ telemetry for now, further evaluation pending findings of noted workup, elective cardiac monitoring                               -HEMATOLOGY: H/H without anemia, Platelets 208, patient should have all age and risk appropriate malignancy screenings with PCP or sooner if clinically suspected   -LE duplex pending      DVT ppx:  LMWH    PULMONARY:  protecting airway, saturating well , check CXR     RENAL: BUN elevated /Cr within range, monitor urine output, maintain adequate hydration       Na Goal:  135-145    ID: afebrile,  leukocytosis resolved - possibly acutely reactive in setting of cocaine use , monitor for si/sx of infection     OTHER:  condition and plan of care d/w patient, ICU, questions and concerns addressed.     DISPOSITION: Rehab or home depending on PT eval once stable and workup is complete. Social work follow up for substance use disorder.      CORE MEASURES:        Admission NIHSS: 8     Tenecteplase : [x] YES [] NO      LDL/HDL/A1C:  54/71/5.6     Depression Screen- if depression hx and/or present      Statin Therapy: as noted      Dysphagia Screen: [] PASS [x] FAIL     Smoking [] YES [] NO pending      Afib [] YES [x] NO     Stroke Education [] YES [] NO 3/28/24    Obtain screening lower extremity venous ultrasound in patients who meet 1 or more of the following criteria as patient is high risk for DVT/PE on admission:   [] History of DVT/PE  []Hypercoagulable states (Factor V Leiden, Cancer, OCP, etc. )  []Prolonged immobility (hemiplegia/hemiparesis/post operative or any other extended immobilization)  [] Transferred from outside facility (Rehab or Long term care)  [] Age </= to 50 ASSESSMENT: 54 y/o male with unknown PMHx presented to ED as stroke code. As per ED documentation patient last known well was 12AM 3/28/24 and developed acute onset Left  facial palsy , Left  facial numbness, LUE/LLE hemiparesis and paresthesias.  CT head without acute hemorrhage or infarction,  inital NIHSS 8, given tenecteplase at 1:12AM 3/28/24.  CT angiogram without significant flow limiting stenosis or large vessel occlusion there for not thrombectomy candidate. Concern for right hemispheric dysfunction unclear etiology, Drug screen revealed cocaine metabolite possibly contributory , no noted cerebral infarction on MRI.    Concern for functional neurologic disorder    NEURO:   -Neurologically appears back to baseline.    - Stroke neuro checks q 4 hr   - gradual age and risk specific normotension as tolerated    -ANTITHROMBOTIC THERAPY: on ASA 81mg daily per ICU, GI PPX   -titrate statin to LDL goal less than 70  -MRI Brain w/o as noted   -nsx follow up for incidental spinal findings as noted above   -Dysphagia screen: fail- SLP follow up for further advisement, NPO in interim.    -Physical therapy/OT/Speech eval/treatment.     -CARDIOVASCULAR:  TTE as noted , cardiac monitoring w/ telemetry for now, further evaluation pending findings of noted workup, elective cardiac monitoring                               -HEMATOLOGY: H/H without anemia, Platelets 208, patient should have all age and risk appropriate malignancy screenings with PCP or sooner if clinically suspected   -LE duplex pending      DVT ppx:  LMWH    PULMONARY:  protecting airway, saturating well , check CXR     RENAL: BUN elevated /Cr within range, monitor urine output, maintain adequate hydration       Na Goal:  135-145    ID: afebrile,  leukocytosis resolved - possibly acutely reactive in setting of cocaine use , monitor for si/sx of infection     OTHER:  condition and plan of care d/w patient, ICU, questions and concerns addressed.     DISPOSITION: Rehab or home depending on PT eval once stable and workup is complete. Social work follow up for substance use disorder.      CORE MEASURES:        Admission NIHSS: 8     Tenecteplase : [x] YES [] NO      LDL/HDL/A1C:  54/71/5.6     Depression Screen- if depression hx and/or present      Statin Therapy: as noted      Dysphagia Screen: [] PASS [x] FAIL     Smoking [] YES [] NO pending      Afib [] YES [x] NO     Stroke Education [] YES [] NO 3/28/24    Obtain screening lower extremity venous ultrasound in patients who meet 1 or more of the following criteria as patient is high risk for DVT/PE on admission:   [] History of DVT/PE  []Hypercoagulable states (Factor V Leiden, Cancer, OCP, etc. )  []Prolonged immobility (hemiplegia/hemiparesis/post operative or any other extended immobilization)  [] Transferred from outside facility (Rehab or Long term care)  [] Age </= to 50

## 2024-03-29 NOTE — DISCHARGE NOTE PROVIDER - HOSPITAL COURSE
53M presented to ED as stroke code. As per ED documentation patient LKN 12AM and developed acute onset L facial droop, L facial numbness, LUE/LLE weakness and numbness. Stroke code imaging negative, inital NIHSS 8, given tenecteplase at 1:12AM. At the time of examination NIHSS 15, patient had expressive aphasia with inability to talk.  Patient admitted to NSICU post TNK administration. CTH post TNK negative. Patient NIHSS returned to 0. Patient passed dysphagia screen. TTE completed which showed EF 65%. No WMA. MRI brain negative. MRI c-spine with chronic c5-c6 disc dz. A1c wnl. LDL 54. Urine drug screen positive for cocaine. Social work consulted. Patient remained stable with a NIHSS of zero. Seen by PT/Rehab medicine who deemed patient ok for home with outpatient PT. Patient stable for discharge on 3/29/24 with outpatient follow up with PMD.

## 2024-03-29 NOTE — PHYSICAL THERAPY INITIAL EVALUATION ADULT - ADDITIONAL COMMENTS
pt reports living in apartment with 3 + 1 LUCIE with no handrails, one additional step inside from kitchen to living room. Lives alone, no assist available. Independent prior. Owns no DME

## 2024-03-29 NOTE — PROGRESS NOTE ADULT - SUBJECTIVE AND OBJECTIVE BOX
Chief complaint:   Patient is a 53y old  Male who presents with a chief complaint of stroke (28 Mar 2024 08:57)    HPI:  54 y/o male with unknown PMHx presented to ED as stroke code. As per ED documentation patient LKN 12AM and developed acute onset L facial droop, L facial numbness, LUE/LLE weakness and numbness. Stroke code imaging negative, inital NIHSS 8, given tenecteplase at 1:12AM. At the time of examination NIHSS 15, patient had expressive aphasia with inability to talk.   (28 Mar 2024 02:14)        24hr EVENTS:      ROS: [ ]  Unable to assess due to mental status   All other systems negative    -----------------------------------------------------------------------------------------------------------------------------------------------------------------------------------  ICU Vital Signs Last 24 Hrs  T(C): 36.7 (29 Mar 2024 08:02), Max: 36.8 (28 Mar 2024 12:02)  T(F): 98.1 (29 Mar 2024 08:02), Max: 98.2 (28 Mar 2024 12:02)  HR: 62 (29 Mar 2024 08:00) (59 - 95)  BP: 129/77 (29 Mar 2024 08:00) (106/58 - 129/77)  BP(mean): 93 (29 Mar 2024 08:00) (70 - 97)  ABP: --  ABP(mean): --  RR: 10 (29 Mar 2024 08:00) (10 - 20)  SpO2: 98% (29 Mar 2024 08:00) (95% - 100%)    O2 Parameters below as of 29 Mar 2024 08:00  Patient On (Oxygen Delivery Method): room air            I&O's Summary    28 Mar 2024 07:01  -  29 Mar 2024 07:00  --------------------------------------------------------  IN: 2730 mL / OUT: 1375 mL / NET: 1355 mL    29 Mar 2024 07:01  -  29 Mar 2024 09:36  --------------------------------------------------------  IN: 240 mL / OUT: 0 mL / NET: 240 mL        MEDICATIONS  (STANDING):  chlorhexidine 2% Cloths 1 Application(s) Topical <User Schedule>  dextrose 50% Injectable 25 Gram(s) IV Push once  dextrose 50% Injectable 25 Gram(s) IV Push once  dextrose 50% Injectable 12.5 Gram(s) IV Push once  insulin lispro (ADMELOG) corrective regimen sliding scale   SubCutaneous every 6 hours      RESPIRATORY:        IMAGING:   Recent imaging studies were reviewed.    LAB RESULTS:                          12.6   7.23  )-----------( 208      ( 29 Mar 2024 03:10 )             38.8       PT/INR - ( 28 Mar 2024 00:50 )   PT: 11.6 sec;   INR: 1.05 ratio         PTT - ( 28 Mar 2024 00:50 )  PTT:28.9 sec    03-29    139  |  104  |  23.1<H>  ----------------------------<  109<H>  4.3   |  25.0  |  1.05    Ca    8.2<L>      29 Mar 2024 03:10  Phos  3.8     03-29  Mg     2.1     03-29    TPro  6.9  /  Alb  3.6  /  TBili  0.3<L>  /  DBili  x   /  AST  22  /  ALT  13  /  AlkPhos  64  03-28        -----------------------------------------------------------------------------------------------------------------------------------------------------------------------------------    PHYSICAL EXAM:  General: Calm, laying in bed  HEENT: MMM  Neuro:  -Mental status- No acute distress, AOx3, conversational, following commands  -CN- PERRL 3mm, EOMI, tongue midline, face symmetric  -Motor- full strength in all ext  -Sensation- intact to LT   -Coordination- no dysmetria noted    CV:   Pulm: Clear to auscultation  Abd: Soft, nontender, nondistended  Ext: No edema  Skin: warm, dry     Chief complaint:   Patient is a 53y old  Male who presents with a chief complaint of stroke (28 Mar 2024 08:57)    HPI:  54 y/o male with unknown PMHx presented to ED as stroke code. As per ED documentation patient LKN 12AM and developed acute onset L facial droop, L facial numbness, LUE/LLE weakness and numbness. Stroke code imaging negative, inital NIHSS 8, given tenecteplase at 1:12AM. At the time of examination NIHSS 15, patient had expressive aphasia with inability to talk.   (28 Mar 2024 02:14)        24hr EVENTS:  no acute issues    ROS:no complaints  All other systems negative    -----------------------------------------------------------------------------------------------------------------------------------------------------------------------------------  ICU Vital Signs Last 24 Hrs  T(C): 36.7 (29 Mar 2024 08:02), Max: 36.8 (28 Mar 2024 12:02)  T(F): 98.1 (29 Mar 2024 08:02), Max: 98.2 (28 Mar 2024 12:02)  HR: 62 (29 Mar 2024 08:00) (59 - 95)  BP: 129/77 (29 Mar 2024 08:00) (106/58 - 129/77)  BP(mean): 93 (29 Mar 2024 08:00) (70 - 97)  ABP: --  ABP(mean): --  RR: 10 (29 Mar 2024 08:00) (10 - 20)  SpO2: 98% (29 Mar 2024 08:00) (95% - 100%)    O2 Parameters below as of 29 Mar 2024 08:00  Patient On (Oxygen Delivery Method): room air            I&O's Summary    28 Mar 2024 07:01  -  29 Mar 2024 07:00  --------------------------------------------------------  IN: 2730 mL / OUT: 1375 mL / NET: 1355 mL    29 Mar 2024 07:01  -  29 Mar 2024 09:36  --------------------------------------------------------  IN: 240 mL / OUT: 0 mL / NET: 240 mL        MEDICATIONS  (STANDING):  chlorhexidine 2% Cloths 1 Application(s) Topical <User Schedule>  dextrose 50% Injectable 25 Gram(s) IV Push once  dextrose 50% Injectable 25 Gram(s) IV Push once  dextrose 50% Injectable 12.5 Gram(s) IV Push once  insulin lispro (ADMELOG) corrective regimen sliding scale   SubCutaneous every 6 hours      RESPIRATORY:        IMAGING:   Recent imaging studies were reviewed.    LAB RESULTS:                          12.6   7.23  )-----------( 208      ( 29 Mar 2024 03:10 )             38.8       PT/INR - ( 28 Mar 2024 00:50 )   PT: 11.6 sec;   INR: 1.05 ratio         PTT - ( 28 Mar 2024 00:50 )  PTT:28.9 sec    03-29    139  |  104  |  23.1<H>  ----------------------------<  109<H>  4.3   |  25.0  |  1.05    Ca    8.2<L>      29 Mar 2024 03:10  Phos  3.8     03-29  Mg     2.1     03-29    TPro  6.9  /  Alb  3.6  /  TBili  0.3<L>  /  DBili  x   /  AST  22  /  ALT  13  /  AlkPhos  64  03-28        -----------------------------------------------------------------------------------------------------------------------------------------------------------------------------------    PHYSICAL EXAM:  General: Calm, laying in bed  HEENT: MMM  Neuro:  -Mental status- No acute distress, AOx3, conversational, following commands  -CN- PERRL 3mm, EOMI, tongue midline, face symmetric  -Motor- full strength in all ext  -Sensation- intact to LT   -Coordination- no dysmetria noted    CV: RRR  Pulm: Clear to auscultation  Abd: Soft, nontender, nondistended  Ext: No edema  Skin: warm, dry

## 2024-04-24 NOTE — H&P ADULT - HISTORY OF PRESENT ILLNESS
Detail Level: Detailed Detail Level: Generalized 54 y/o male with unknown PMHx presented to ED as stroke code. As per ED documentation patient LKN 12AM and developed acute onset L facial droop, L facial numbness, LUE/LLE weakness and numbness. Stroke code imaging negative, inital NIHSS 8, given tenecteplase at 1:12AM. At the time of examination NIHSS 15, patient had expressive aphasia with inability to talk.   Sunscreen Recommendations: broad spectrum sunscreen SPF 30 or greater daily, reapply at least every 2 hours Sunscreen Recommendation Label Override: Sunscreen Detail Level: Simple 0 = independent

## 2024-11-04 ENCOUNTER — EMERGENCY (EMERGENCY)
Facility: HOSPITAL | Age: 54
LOS: 1 days | Discharge: HOME | End: 2024-11-04
Attending: EMERGENCY MEDICINE
Payer: COMMERCIAL

## 2024-11-04 VITALS
TEMPERATURE: 98 F | DIASTOLIC BLOOD PRESSURE: 80 MMHG | RESPIRATION RATE: 16 BRPM | OXYGEN SATURATION: 98 % | HEART RATE: 71 BPM | SYSTOLIC BLOOD PRESSURE: 127 MMHG

## 2024-11-04 DIAGNOSIS — Z98.890 OTHER SPECIFIED POSTPROCEDURAL STATES: Chronic | ICD-10-CM

## 2024-11-04 PROCEDURE — 93005 ELECTROCARDIOGRAM TRACING: CPT

## 2024-11-04 PROCEDURE — 93010 ELECTROCARDIOGRAM REPORT: CPT

## 2024-11-04 PROCEDURE — 99283 EMERGENCY DEPT VISIT LOW MDM: CPT | Mod: 25

## 2024-11-04 PROCEDURE — 99284 EMERGENCY DEPT VISIT MOD MDM: CPT

## 2024-11-04 NOTE — ED PROVIDER NOTE - CLINICAL SUMMARY MEDICAL DECISION MAKING FREE TEXT BOX
Pt is a 55 yo male with PMH of anxiety on zoloft, TIAs in the past, HTN, HLD presenting with chest discomfort. Pt reports that he was in an argument with his friend/roommate today when he got chest pain and SOB momentarily because he was "riled up". His friend called the ambulance for him due to the anxiety. Pt is medically asymptomatic at presentation and on reassessment.    vs stable, ekg WNL, nsr on repeat.    exam unremarkable. no complaints at this time.     ddx includes but not limited to anxiety/panic attack vs acs vs PE vs esophageal rupture vs aortic pathology vs msk/gerd. low concern for abdominal etiology of symptoms. chest pain likely due to anxiety caused from argument today.

## 2024-11-04 NOTE — ED PROVIDER NOTE - NSFOLLOWUPINSTRUCTIONS_ED_ALL_ED_FT
Chest Pain    Please follow up with your primary doctor.     Chest pain can be caused by many different conditions which may or may not be dangerous. Causes include heartburn, lung infections, heart attack, blood clot in lungs, skin infections, strain or damage to muscle, cartilage, or bones, etc. In addition to a history and physical examination, an electrocardiogram (ECG) or other lab tests may have been performed to determine the cause of your chest pain. Follow up with your primary care provider or with a cardiologist as instructed.     SEEK IMMEDIATE MEDICAL CARE IF YOU HAVE ANY OF THE FOLLOWING SYMPTOMS: worsening chest pain, coughing up blood, unexplained back/neck/jaw pain, severe abdominal pain, dizziness or lightheadedness, fainting, shortness of breath, sweaty or clammy skin, vomiting, or racing heart beat. These symptoms may represent a serious problem that is an emergency. Do not wait to see if the symptoms will go away. Get medical help right away. Call 911 and do not drive yourself to the hospital.

## 2024-11-04 NOTE — ED PROVIDER NOTE - OBJECTIVE STATEMENT
Pt is a 53 yo male with PMH of anxiety on zoloft, TIAs in the past, HTN, HLD presenting with chest discomfort. Pt reports that he was in an argument with his friend/roommate today when he got chest pain and SOB momentarily because he was "riled up". His friend called the ambulance for him due to the anxiety. Pt is medically asymptomatic at presentation and on reassessment. Pt denies fever, abdominal pain, N/V/D.

## 2024-11-04 NOTE — ED PROVIDER NOTE - PATIENT PORTAL LINK FT
You can access the FollowMyHealth Patient Portal offered by [facility] by registering at the following website: [email or web address]. By joining [facility]’s FollowMyHealth portal, you will also be able to view your health information using other applications (apps) compatible with our system.

## 2024-11-04 NOTE — ED ADULT NURSE NOTE - OBJECTIVE STATEMENT
Pt is AOX4 reporting that during an argument with his friend he experienced SOB, lightheadedness and non-radiating chest pain causing him to fall.  Denies head strike, LOC, N/V, headache, back pain, coughing. Pt reports that he is not experiencing symptoms right now. Denies SI/HI.  Pt updated on the plan of care and verbalizes understanding.

## 2024-11-04 NOTE — ED ADULT TRIAGE NOTE - CHIEF COMPLAINT QUOTE
Pt. states he was arguing with his girlfriend and when he got angry he felt discomfort in his chest and dizziness. Pt. states all symptoms have resolved.

## 2024-11-04 NOTE — ED PROVIDER NOTE - ATTENDING CONTRIBUTION TO CARE
I performed a face to face bedside interview with patient regarding history of present illness, review of symptoms and past medical history. I completed an independent physical exam.  I have discussed patient's plan of care with resident.   I agree with note as stated above including HISTORY OF PRESENT ILLNESS, HIV, PAST MEDICAL/SURGICAL/FAMILY/SOCIAL HISTORY, ALLERGIES AND HOME MEDICATIONS, REVIEW OF SYSTEMS, PHYSICAL EXAM, MEDICAL DECISION MAKING and any PROGRESS NOTES during the time I functioned as the attending physician for this patient unless otherwise noted. My brief assessment is as follows:     General: NAD, well appearing  HEENT: Normocephalic, atraumatic  Neck: No apparent stiffness or JVD  Pulm: Chest wall symmetric and nontender, lungs clear to ascultation   Cardiac: Regular rate and regular rhythm  Abdomen: Nontender and nondistended  Skin: Skin is warm, dry and intact without rashes or lesions.  Neuro: No motor or sensory deficits above reported baseline  MSK: No deformity or tenderness above reported baseline

## 2025-09-15 ENCOUNTER — TRANSCRIPTION ENCOUNTER (OUTPATIENT)
Age: 55
End: 2025-09-15